# Patient Record
Sex: MALE | Race: WHITE | Employment: FULL TIME | URBAN - METROPOLITAN AREA
[De-identification: names, ages, dates, MRNs, and addresses within clinical notes are randomized per-mention and may not be internally consistent; named-entity substitution may affect disease eponyms.]

---

## 2017-04-14 RX ORDER — LEVOTHYROXINE SODIUM 0.15 MG/1
TABLET ORAL
Qty: 90 TABLET | Refills: 0 | Status: SHIPPED | OUTPATIENT
Start: 2017-04-14 | End: 2017-07-20

## 2017-04-14 RX ORDER — LISINOPRIL AND HYDROCHLOROTHIAZIDE 12.5; 1 MG/1; MG/1
TABLET ORAL
Qty: 90 TABLET | Refills: 0 | Status: SHIPPED | OUTPATIENT
Start: 2017-04-14 | End: 2017-07-20

## 2017-07-20 RX ORDER — LISINOPRIL AND HYDROCHLOROTHIAZIDE 12.5; 1 MG/1; MG/1
TABLET ORAL
Qty: 30 TABLET | Refills: 0 | Status: SHIPPED | OUTPATIENT
Start: 2017-07-20 | End: 2017-08-11

## 2017-07-20 RX ORDER — LEVOTHYROXINE SODIUM 0.15 MG/1
TABLET ORAL
Qty: 30 TABLET | Refills: 0 | Status: SHIPPED | OUTPATIENT
Start: 2017-07-20 | End: 2017-08-11

## 2017-07-20 NOTE — TELEPHONE ENCOUNTER
Last OV was 5/9/17  Last refill levothyroxine and lisinopril was on 4/14/16      Rizzoma. Pt is over due for a medication f/u. Please call and schedule Pt for an appointment.      30 day supply is pending Dr. Melodie Sims approval. A 90 day supply will no

## 2017-08-04 ENCOUNTER — TELEPHONE (OUTPATIENT)
Dept: FAMILY MEDICINE CLINIC | Facility: CLINIC | Age: 67
End: 2017-08-04

## 2017-08-04 NOTE — TELEPHONE ENCOUNTER
Patient called and made appt for next week for med follow up. Levothyroxine  Lisinopril     He wanted to do his labs prior to the appt. He wanted to add Anemia lab since he was borderline last time and anything else he was due for.   His appt is at 2:00 an

## 2017-08-04 NOTE — TELEPHONE ENCOUNTER
Patient called and made appt for next week for med follow up. Levothyroxine  Lisinopril    He wanted to do his labs prior to the appt. He wanted to add Anemia lab since he was borderline last time and anything else he was due for.   His appt is at 2:00 and

## 2017-08-07 NOTE — TELEPHONE ENCOUNTER
Would you like to do Anemia labs, IRon, TIBC and ferritin? Pt labs in 2016 showed decreased iron level.

## 2017-08-11 ENCOUNTER — OFFICE VISIT (OUTPATIENT)
Dept: FAMILY MEDICINE CLINIC | Facility: CLINIC | Age: 67
End: 2017-08-11

## 2017-08-11 ENCOUNTER — LAB ENCOUNTER (OUTPATIENT)
Dept: LAB | Age: 67
End: 2017-08-11
Attending: FAMILY MEDICINE
Payer: COMMERCIAL

## 2017-08-11 VITALS
TEMPERATURE: 98 F | HEART RATE: 64 BPM | SYSTOLIC BLOOD PRESSURE: 116 MMHG | BODY MASS INDEX: 22.88 KG/M2 | HEIGHT: 64 IN | WEIGHT: 134 LBS | RESPIRATION RATE: 15 BRPM | DIASTOLIC BLOOD PRESSURE: 80 MMHG

## 2017-08-11 DIAGNOSIS — E03.9 ACQUIRED HYPOTHYROIDISM: ICD-10-CM

## 2017-08-11 DIAGNOSIS — D50.0 IRON DEFICIENCY ANEMIA DUE TO CHRONIC BLOOD LOSS: Primary | ICD-10-CM

## 2017-08-11 DIAGNOSIS — D50.0 IRON DEFICIENCY ANEMIA DUE TO CHRONIC BLOOD LOSS: ICD-10-CM

## 2017-08-11 LAB
25-HYDROXYVITAMIN D (TOTAL): 38.7 NG/ML (ref 30–100)
ALBUMIN SERPL-MCNC: 3.7 G/DL (ref 3.5–4.8)
ALP LIVER SERPL-CCNC: 108 U/L (ref 45–117)
ALT SERPL-CCNC: 48 U/L (ref 17–63)
AST SERPL-CCNC: 26 U/L (ref 15–41)
BASOPHILS # BLD AUTO: 0.03 X10(3) UL (ref 0–0.1)
BASOPHILS NFR BLD AUTO: 0.4 %
BILIRUB SERPL-MCNC: 0.5 MG/DL (ref 0.1–2)
BUN BLD-MCNC: 20 MG/DL (ref 8–20)
CALCIUM BLD-MCNC: 9 MG/DL (ref 8.3–10.3)
CHLORIDE: 109 MMOL/L (ref 101–111)
CHOLEST SMN-MCNC: 157 MG/DL (ref ?–200)
CO2: 25 MMOL/L (ref 22–32)
CREAT BLD-MCNC: 1.13 MG/DL (ref 0.7–1.3)
DEPRECATED HBV CORE AB SER IA-ACNC: 7.4 NG/ML (ref 22–322)
EOSINOPHIL # BLD AUTO: 0.15 X10(3) UL (ref 0–0.3)
EOSINOPHIL NFR BLD AUTO: 2 %
ERYTHROCYTE [DISTWIDTH] IN BLOOD BY AUTOMATED COUNT: 12.9 % (ref 11.5–16)
GLUCOSE BLD-MCNC: 92 MG/DL (ref 70–99)
HCT VFR BLD AUTO: 42.9 % (ref 37–53)
HDLC SERPL-MCNC: 44 MG/DL (ref 45–?)
HDLC SERPL: 3.57 {RATIO} (ref ?–4.97)
HEPATITIS C VIRUS AB INTERPRETATION: NONREACTIVE
HGB BLD-MCNC: 13.9 G/DL (ref 13–17)
IMMATURE GRANULOCYTE COUNT: 0.05 X10(3) UL (ref 0–1)
IMMATURE GRANULOCYTE RATIO %: 0.7 %
LDLC SERPL CALC-MCNC: 87 MG/DL (ref ?–130)
LDLC SERPL-MCNC: 26 MG/DL (ref 5–40)
LYMPHOCYTES # BLD AUTO: 1 X10(3) UL (ref 0.9–4)
LYMPHOCYTES NFR BLD AUTO: 13.4 %
M PROTEIN MFR SERPL ELPH: 7.1 G/DL (ref 6.1–8.3)
MCH RBC QN AUTO: 31 PG (ref 27–33.2)
MCHC RBC AUTO-ENTMCNC: 32.4 G/DL (ref 31–37)
MCV RBC AUTO: 95.8 FL (ref 80–99)
MONOCYTES # BLD AUTO: 0.69 X10(3) UL (ref 0.1–0.6)
MONOCYTES NFR BLD AUTO: 9.3 %
NEUTROPHIL ABS PRELIM: 5.53 X10 (3) UL (ref 1.3–6.7)
NEUTROPHILS # BLD AUTO: 5.53 X10(3) UL (ref 1.3–6.7)
NEUTROPHILS NFR BLD AUTO: 74.2 %
NONHDLC SERPL-MCNC: 113 MG/DL (ref ?–130)
PLATELET # BLD AUTO: 325 10(3)UL (ref 150–450)
POTASSIUM SERPL-SCNC: 4.2 MMOL/L (ref 3.6–5.1)
PSA SERPL-MCNC: 2 NG/ML (ref 0.01–4)
RBC # BLD AUTO: 4.48 X10(6)UL (ref 3.8–5.8)
RED CELL DISTRIBUTION WIDTH-SD: 45.2 FL (ref 35.1–46.3)
RETIC ABS CALC AUTO: 66.3 X10(3) UL (ref 22.5–147.5)
RETIC IRF CALC: 0.15 RATIO (ref 0.09–0.3)
RETIC%: 1.5 % (ref 0.5–2.5)
RETICULOCYTE HEMOGLOBIN EQUIVALENT: 38.8 PG (ref 28.2–36.3)
SODIUM SERPL-SCNC: 141 MMOL/L (ref 136–144)
TRIGLYCERIDES: 128 MG/DL (ref ?–150)
TSI SER-ACNC: 3.16 MIU/ML (ref 0.35–5.5)
WBC # BLD AUTO: 7.5 X10(3) UL (ref 4–13)

## 2017-08-11 PROCEDURE — 99213 OFFICE O/P EST LOW 20 MIN: CPT | Performed by: FAMILY MEDICINE

## 2017-08-11 PROCEDURE — 86803 HEPATITIS C AB TEST: CPT | Performed by: FAMILY MEDICINE

## 2017-08-11 PROCEDURE — 85045 AUTOMATED RETICULOCYTE COUNT: CPT | Performed by: FAMILY MEDICINE

## 2017-08-11 PROCEDURE — 82306 VITAMIN D 25 HYDROXY: CPT | Performed by: FAMILY MEDICINE

## 2017-08-11 PROCEDURE — 80050 GENERAL HEALTH PANEL: CPT | Performed by: FAMILY MEDICINE

## 2017-08-11 PROCEDURE — 36415 COLL VENOUS BLD VENIPUNCTURE: CPT | Performed by: FAMILY MEDICINE

## 2017-08-11 PROCEDURE — 82728 ASSAY OF FERRITIN: CPT | Performed by: FAMILY MEDICINE

## 2017-08-11 PROCEDURE — 80061 LIPID PANEL: CPT | Performed by: FAMILY MEDICINE

## 2017-08-11 PROCEDURE — 84153 ASSAY OF PSA TOTAL: CPT | Performed by: FAMILY MEDICINE

## 2017-08-11 RX ORDER — LISINOPRIL AND HYDROCHLOROTHIAZIDE 12.5; 1 MG/1; MG/1
TABLET ORAL
Qty: 90 TABLET | Refills: 3 | Status: SHIPPED | OUTPATIENT
Start: 2017-08-11 | End: 2018-08-16

## 2017-08-11 RX ORDER — LEVOTHYROXINE SODIUM 0.15 MG/1
TABLET ORAL
Qty: 90 TABLET | Refills: 3 | Status: SHIPPED | OUTPATIENT
Start: 2017-08-11 | End: 2018-08-16

## 2017-08-11 NOTE — PROGRESS NOTES
Here for review and renewal of medications. On a happy note    He won one yet another contest for pull ups      Exam vital signs are normal he is in no respiratory distress his thyroid inspects and palpates normally and he is clinically euthyroid.   His yulisa

## 2017-08-14 ENCOUNTER — TELEPHONE (OUTPATIENT)
Dept: FAMILY MEDICINE CLINIC | Facility: CLINIC | Age: 67
End: 2017-08-14

## 2017-08-14 DIAGNOSIS — D50.0 IRON DEFICIENCY ANEMIA DUE TO CHRONIC BLOOD LOSS: Primary | ICD-10-CM

## 2017-08-14 NOTE — TELEPHONE ENCOUNTER
Patient called per request of Dr. Yamilet Brush- Ferritin level very low at 7.4. Left message to call back. Patient needs to see Hematology.

## 2017-08-14 NOTE — TELEPHONE ENCOUNTER
Patient returned phone call- feels fine. Will contact Hematology for appt. Dr. Maya Dnucan aware patient feeling fine. Spoke to [facility] and patient can be seen 8/22/17 at 11:15.

## 2017-08-16 ENCOUNTER — LAB ENCOUNTER (OUTPATIENT)
Dept: LAB | Age: 67
End: 2017-08-16
Attending: FAMILY MEDICINE
Payer: COMMERCIAL

## 2017-08-16 DIAGNOSIS — D50.0 IRON DEFICIENCY ANEMIA DUE TO CHRONIC BLOOD LOSS: ICD-10-CM

## 2017-08-16 LAB
BASOPHILS # BLD AUTO: 0.03 X10(3) UL (ref 0–0.1)
BASOPHILS NFR BLD AUTO: 0.4 %
EOSINOPHIL # BLD AUTO: 0.2 X10(3) UL (ref 0–0.3)
EOSINOPHIL NFR BLD AUTO: 2.8 %
ERYTHROCYTE [DISTWIDTH] IN BLOOD BY AUTOMATED COUNT: 12.8 % (ref 11.5–16)
HCT VFR BLD AUTO: 40 % (ref 37–53)
HGB BLD-MCNC: 12.7 G/DL (ref 13–17)
IMMATURE GRANULOCYTE COUNT: 0.04 X10(3) UL (ref 0–1)
IMMATURE GRANULOCYTE RATIO %: 0.6 %
LYMPHOCYTES # BLD AUTO: 1.15 X10(3) UL (ref 0.9–4)
LYMPHOCYTES NFR BLD AUTO: 16 %
MCH RBC QN AUTO: 30.6 PG (ref 27–33.2)
MCHC RBC AUTO-ENTMCNC: 31.8 G/DL (ref 31–37)
MCV RBC AUTO: 96.4 FL (ref 80–99)
MONOCYTES # BLD AUTO: 0.76 X10(3) UL (ref 0.1–0.6)
MONOCYTES NFR BLD AUTO: 10.5 %
NEUTROPHIL ABS PRELIM: 5.03 X10 (3) UL (ref 1.3–6.7)
NEUTROPHILS # BLD AUTO: 5.03 X10(3) UL (ref 1.3–6.7)
NEUTROPHILS NFR BLD AUTO: 69.7 %
PLATELET # BLD AUTO: 282 10(3)UL (ref 150–450)
RBC # BLD AUTO: 4.15 X10(6)UL (ref 3.8–5.8)
RED CELL DISTRIBUTION WIDTH-SD: 45.7 FL (ref 35.1–46.3)
WBC # BLD AUTO: 7.2 X10(3) UL (ref 4–13)

## 2017-08-16 PROCEDURE — 85025 COMPLETE CBC W/AUTO DIFF WBC: CPT | Performed by: FAMILY MEDICINE

## 2017-08-16 PROCEDURE — 36415 COLL VENOUS BLD VENIPUNCTURE: CPT | Performed by: FAMILY MEDICINE

## 2017-08-22 ENCOUNTER — OFFICE VISIT (OUTPATIENT)
Dept: HEMATOLOGY/ONCOLOGY | Facility: HOSPITAL | Age: 67
End: 2017-08-22
Attending: INTERNAL MEDICINE
Payer: COMMERCIAL

## 2017-08-22 VITALS
SYSTOLIC BLOOD PRESSURE: 118 MMHG | WEIGHT: 135 LBS | HEART RATE: 68 BPM | DIASTOLIC BLOOD PRESSURE: 75 MMHG | RESPIRATION RATE: 16 BRPM | BODY MASS INDEX: 23 KG/M2 | TEMPERATURE: 98 F

## 2017-08-22 DIAGNOSIS — D50.0 IRON DEFICIENCY ANEMIA DUE TO CHRONIC BLOOD LOSS: Primary | ICD-10-CM

## 2017-08-22 PROCEDURE — 99214 OFFICE O/P EST MOD 30 MIN: CPT | Performed by: INTERNAL MEDICINE

## 2017-08-22 RX ORDER — DOXEPIN HYDROCHLORIDE 50 MG/1
1 CAPSULE ORAL DAILY
COMMUNITY

## 2017-08-22 RX ORDER — FERROUS SULFATE 325(65) MG
325 TABLET ORAL 2 TIMES DAILY
Qty: 60 TABLET | Refills: 4 | Status: SHIPPED | OUTPATIENT
Start: 2017-08-22 | End: 2018-03-14

## 2017-08-22 RX ORDER — FERROUS SULFATE 7.5 MG/0.5
SYRINGE (EA) ORAL DAILY
COMMUNITY
End: 2017-08-22 | Stop reason: CLARIF

## 2017-08-22 NOTE — PROGRESS NOTES
Patient here for consult, but patient has seen Dr Nickie Keller in the past. Patient had recent lab work done and ferritin was 7.4. Patient is very healthy and fit and says he feels great and is not having any of the symptoms you would expect.  Patient denies troubl

## 2017-08-22 NOTE — PROGRESS NOTES
[facility]cer Center Progress Note    Problem List:      Patient Active Problem List:     Benign neoplasm of colon     Unspecified cataract     Unspecified asthma(493.90)     Hypothyroid     Essential hypertension, benign     Laboratory examination ordered as par mouth daily. Disp:  Rfl:    Ferrous Sulfate 325 (65 Fe) MG Oral Tab Take 1 tablet (325 mg total) by mouth 2 (two) times daily. Disp: 60 tablet Rfl: 4   Cholecalciferol (VITAMIN D) 1000 units Oral Tab Take 1 capsule by mouth daily.  Disp:  Rfl:    Levothyrox 08/11/2017   ALB 3.7 08/11/2017   TP 7.1 08/11/2017           Impression:     Iron deficiency anemia: This has been chronic with little change over the last year. He has been taking once daily oral iron with minimal increase in the ferritin.  He had a ne

## 2017-11-22 ENCOUNTER — APPOINTMENT (OUTPATIENT)
Dept: HEMATOLOGY/ONCOLOGY | Facility: HOSPITAL | Age: 67
End: 2017-11-22
Attending: INTERNAL MEDICINE
Payer: COMMERCIAL

## 2017-12-20 ENCOUNTER — TELEPHONE (OUTPATIENT)
Dept: HEMATOLOGY/ONCOLOGY | Facility: HOSPITAL | Age: 67
End: 2017-12-20

## 2017-12-20 NOTE — TELEPHONE ENCOUNTER
Pt states he had his scopes. He was told the cause of his low iron is ulcers in his esophagus and intestine. Does he need to keep his appointment with Dr. Viktoria Burrows on Friday? Per Dr. Viktoria Burrows -- no if GI handling his low iron.   Pt informed and verbalized under

## 2017-12-22 ENCOUNTER — APPOINTMENT (OUTPATIENT)
Dept: HEMATOLOGY/ONCOLOGY | Age: 67
End: 2017-12-22
Payer: COMMERCIAL

## 2018-03-14 ENCOUNTER — OFFICE VISIT (OUTPATIENT)
Dept: FAMILY MEDICINE CLINIC | Facility: CLINIC | Age: 68
End: 2018-03-14

## 2018-03-14 ENCOUNTER — LABORATORY ENCOUNTER (OUTPATIENT)
Dept: LAB | Age: 68
End: 2018-03-14
Attending: FAMILY MEDICINE
Payer: COMMERCIAL

## 2018-03-14 VITALS
WEIGHT: 140 LBS | OXYGEN SATURATION: 98 % | HEART RATE: 76 BPM | TEMPERATURE: 98 F | DIASTOLIC BLOOD PRESSURE: 84 MMHG | RESPIRATION RATE: 18 BRPM | BODY MASS INDEX: 25.76 KG/M2 | HEIGHT: 62 IN | SYSTOLIC BLOOD PRESSURE: 128 MMHG

## 2018-03-14 DIAGNOSIS — K27.9 PEPTIC ULCER: ICD-10-CM

## 2018-03-14 DIAGNOSIS — E03.9 HYPOTHYROIDISM, UNSPECIFIED TYPE: ICD-10-CM

## 2018-03-14 DIAGNOSIS — I10 ESSENTIAL HYPERTENSION, BENIGN: ICD-10-CM

## 2018-03-14 DIAGNOSIS — K27.9 PEPTIC ULCER: Primary | ICD-10-CM

## 2018-03-14 LAB
BASOPHILS # BLD AUTO: 0.04 X10(3) UL (ref 0–0.1)
BASOPHILS NFR BLD AUTO: 0.6 %
DEPRECATED HBV CORE AB SER IA-ACNC: 32.8 NG/ML (ref 22–322)
EOSINOPHIL # BLD AUTO: 0.17 X10(3) UL (ref 0–0.3)
EOSINOPHIL NFR BLD AUTO: 2.4 %
ERYTHROCYTE [DISTWIDTH] IN BLOOD BY AUTOMATED COUNT: 11.7 % (ref 11.5–16)
HCT VFR BLD AUTO: 46.6 % (ref 37–53)
HGB BLD-MCNC: 15.1 G/DL (ref 13–17)
IMMATURE GRANULOCYTE COUNT: 0.04 X10(3) UL (ref 0–1)
IMMATURE GRANULOCYTE RATIO %: 0.6 %
LYMPHOCYTES # BLD AUTO: 1.11 X10(3) UL (ref 0.9–4)
LYMPHOCYTES NFR BLD AUTO: 15.4 %
MCH RBC QN AUTO: 32.1 PG (ref 27–33.2)
MCHC RBC AUTO-ENTMCNC: 32.4 G/DL (ref 31–37)
MCV RBC AUTO: 99.1 FL (ref 80–99)
MONOCYTES # BLD AUTO: 0.78 X10(3) UL (ref 0.1–1)
MONOCYTES NFR BLD AUTO: 10.8 %
NEUTROPHIL ABS PRELIM: 5.05 X10 (3) UL (ref 1.3–6.7)
NEUTROPHILS # BLD AUTO: 5.05 X10(3) UL (ref 1.3–6.7)
NEUTROPHILS NFR BLD AUTO: 70.2 %
PLATELET # BLD AUTO: 292 10(3)UL (ref 150–450)
RBC # BLD AUTO: 4.7 X10(6)UL (ref 3.8–5.8)
RED CELL DISTRIBUTION WIDTH-SD: 42.6 FL (ref 35.1–46.3)
RETIC ABS CALC AUTO: 89.3 X10(3) UL (ref 22.5–147.5)
RETIC IRF CALC: 0.17 RATIO (ref 0.09–0.3)
RETIC%: 1.9 % (ref 0.5–2.5)
RETICULOCYTE HEMOGLOBIN EQUIVALENT: 39.3 PG (ref 28.2–36.3)
TSI SER-ACNC: 2.57 MIU/ML (ref 0.35–5.5)
WBC # BLD AUTO: 7.2 X10(3) UL (ref 4–13)

## 2018-03-14 PROCEDURE — 99213 OFFICE O/P EST LOW 20 MIN: CPT | Performed by: FAMILY MEDICINE

## 2018-03-14 PROCEDURE — 84443 ASSAY THYROID STIM HORMONE: CPT | Performed by: FAMILY MEDICINE

## 2018-03-14 PROCEDURE — 82728 ASSAY OF FERRITIN: CPT | Performed by: FAMILY MEDICINE

## 2018-03-14 PROCEDURE — 85025 COMPLETE CBC W/AUTO DIFF WBC: CPT | Performed by: FAMILY MEDICINE

## 2018-03-14 PROCEDURE — 36415 COLL VENOUS BLD VENIPUNCTURE: CPT | Performed by: FAMILY MEDICINE

## 2018-03-14 PROCEDURE — 85045 AUTOMATED RETICULOCYTE COUNT: CPT | Performed by: FAMILY MEDICINE

## 2018-03-14 NOTE — PROGRESS NOTES
Here for general follow-up. Had an acute microcytic anemia and it was discovered that he had both small intestinal and esophageal ulcers. He was placed on iron replacement and proton pump inhibitor and is now entirely symptom-free.   No family history of

## 2018-03-16 ENCOUNTER — TELEPHONE (OUTPATIENT)
Dept: FAMILY MEDICINE CLINIC | Facility: CLINIC | Age: 68
End: 2018-03-16

## 2018-03-16 NOTE — TELEPHONE ENCOUNTER
Informed pt that labs are back, but not yet reviewed due to [address] out of office for a few days, back on Monday. Pt expressed understanding and agreement. Pt requested the Ferritin value and pt was informed of this level.

## 2018-08-16 RX ORDER — LEVOTHYROXINE SODIUM 0.15 MG/1
TABLET ORAL
Qty: 90 TABLET | Refills: 3 | Status: SHIPPED | OUTPATIENT
Start: 2018-08-16 | End: 2019-03-06

## 2018-08-16 RX ORDER — LISINOPRIL AND HYDROCHLOROTHIAZIDE 12.5; 1 MG/1; MG/1
TABLET ORAL
Qty: 90 TABLET | Refills: 3 | Status: SHIPPED | OUTPATIENT
Start: 2018-08-16 | End: 2019-03-06

## 2018-08-16 NOTE — TELEPHONE ENCOUNTER
Levothyroxine passes protocol - requesting 12 month supply    LOV 3/14/18    LRx 8/11/17    Please review and advise

## 2019-03-06 NOTE — TELEPHONE ENCOUNTER
Approve/ deny Levothyroxine 150 mcg?   #90  Approve/ deny Lisinopril 10/12.5 mg?   #90    Patient has physical exam scheduled with Karina Larose on 03/12/19

## 2019-03-06 NOTE — TELEPHONE ENCOUNTER
Levothyroxine  Lisinopril-HCTZ    [facility]  90 days if possible    Patient has upcoming appt for physical

## 2019-03-07 RX ORDER — LEVOTHYROXINE SODIUM 0.15 MG/1
TABLET ORAL
Qty: 90 TABLET | Refills: 0 | Status: SHIPPED | OUTPATIENT
Start: 2019-03-07 | End: 2019-03-12

## 2019-03-07 RX ORDER — LISINOPRIL AND HYDROCHLOROTHIAZIDE 12.5; 1 MG/1; MG/1
TABLET ORAL
Qty: 90 TABLET | Refills: 0 | Status: SHIPPED | OUTPATIENT
Start: 2019-03-07 | End: 2019-03-12

## 2019-03-12 ENCOUNTER — TELEPHONE (OUTPATIENT)
Dept: FAMILY MEDICINE CLINIC | Facility: CLINIC | Age: 69
End: 2019-03-12

## 2019-03-12 ENCOUNTER — LAB ENCOUNTER (OUTPATIENT)
Dept: LAB | Age: 69
End: 2019-03-12
Attending: FAMILY MEDICINE
Payer: COMMERCIAL

## 2019-03-12 ENCOUNTER — OFFICE VISIT (OUTPATIENT)
Dept: FAMILY MEDICINE CLINIC | Facility: CLINIC | Age: 69
End: 2019-03-12
Payer: COMMERCIAL

## 2019-03-12 VITALS
TEMPERATURE: 97 F | OXYGEN SATURATION: 95 % | WEIGHT: 139 LBS | DIASTOLIC BLOOD PRESSURE: 74 MMHG | SYSTOLIC BLOOD PRESSURE: 116 MMHG | RESPIRATION RATE: 16 BRPM | BODY MASS INDEX: 25.58 KG/M2 | HEART RATE: 74 BPM | HEIGHT: 62 IN

## 2019-03-12 DIAGNOSIS — I10 ESSENTIAL HYPERTENSION, BENIGN: ICD-10-CM

## 2019-03-12 DIAGNOSIS — Z12.5 ENCOUNTER FOR SCREENING FOR MALIGNANT NEOPLASM OF PROSTATE: ICD-10-CM

## 2019-03-12 DIAGNOSIS — J45.20 MILD INTERMITTENT ASTHMA WITHOUT COMPLICATION: ICD-10-CM

## 2019-03-12 DIAGNOSIS — Z00.00 LABORATORY EXAM ORDERED AS PART OF ROUTINE GENERAL MEDICAL EXAMINATION: ICD-10-CM

## 2019-03-12 DIAGNOSIS — Z12.11 SCREENING FOR MALIGNANT NEOPLASM OF COLON: ICD-10-CM

## 2019-03-12 DIAGNOSIS — Z00.00 WELL ADULT EXAM: Primary | ICD-10-CM

## 2019-03-12 DIAGNOSIS — E03.9 ACQUIRED HYPOTHYROIDISM: ICD-10-CM

## 2019-03-12 DIAGNOSIS — D50.0 BLOOD LOSS ANEMIA: ICD-10-CM

## 2019-03-12 LAB
ALBUMIN SERPL-MCNC: 3.4 G/DL (ref 3.4–5)
ALBUMIN/GLOB SERPL: 1 {RATIO} (ref 1–2)
ALP LIVER SERPL-CCNC: 106 U/L (ref 45–117)
ALT SERPL-CCNC: 44 U/L (ref 16–61)
ANION GAP SERPL CALC-SCNC: 9 MMOL/L (ref 0–18)
AST SERPL-CCNC: 29 U/L (ref 15–37)
BASOPHILS # BLD AUTO: 0.04 X10(3) UL (ref 0–0.2)
BASOPHILS NFR BLD AUTO: 0.4 %
BILIRUB SERPL-MCNC: 0.3 MG/DL (ref 0.1–2)
BUN BLD-MCNC: 33 MG/DL (ref 7–18)
BUN/CREAT SERPL: 28.9 (ref 10–20)
CALCIUM BLD-MCNC: 8.4 MG/DL (ref 8.5–10.1)
CHLORIDE SERPL-SCNC: 109 MMOL/L (ref 98–107)
CHOLEST SMN-MCNC: 163 MG/DL (ref ?–200)
CO2 SERPL-SCNC: 22 MMOL/L (ref 21–32)
COMPLEXED PSA SERPL-MCNC: 2.06 NG/ML (ref ?–4)
CREAT BLD-MCNC: 1.14 MG/DL (ref 0.7–1.3)
DEPRECATED HBV CORE AB SER IA-ACNC: 15.8 NG/ML (ref 30–530)
DEPRECATED RDW RBC AUTO: 44.6 FL (ref 35.1–46.3)
EOSINOPHIL # BLD AUTO: 0.21 X10(3) UL (ref 0–0.7)
EOSINOPHIL NFR BLD AUTO: 2.2 %
ERYTHROCYTE [DISTWIDTH] IN BLOOD BY AUTOMATED COUNT: 12.5 % (ref 11–15)
GLOBULIN PLAS-MCNC: 3.3 G/DL (ref 2.8–4.4)
GLUCOSE BLD-MCNC: 93 MG/DL (ref 70–99)
HCT VFR BLD AUTO: 43.8 % (ref 39–53)
HDLC SERPL-MCNC: 33 MG/DL (ref 40–59)
HGB BLD-MCNC: 14.6 G/DL (ref 13–17.5)
IMM GRANULOCYTES # BLD AUTO: 0.04 X10(3) UL (ref 0–1)
IMM GRANULOCYTES NFR BLD: 0.4 %
LDLC SERPL CALC-MCNC: 103 MG/DL (ref ?–100)
LYMPHOCYTES # BLD AUTO: 1.13 X10(3) UL (ref 1–4)
LYMPHOCYTES NFR BLD AUTO: 11.6 %
M PROTEIN MFR SERPL ELPH: 6.7 G/DL (ref 6.4–8.2)
MCH RBC QN AUTO: 32.5 PG (ref 26–34)
MCHC RBC AUTO-ENTMCNC: 33.3 G/DL (ref 31–37)
MCV RBC AUTO: 97.6 FL (ref 80–100)
MONOCYTES # BLD AUTO: 1.24 X10(3) UL (ref 0.1–1)
MONOCYTES NFR BLD AUTO: 12.8 %
NEUTROPHILS # BLD AUTO: 7.04 X10 (3) UL (ref 1.5–7.7)
NEUTROPHILS # BLD AUTO: 7.04 X10(3) UL (ref 1.5–7.7)
NEUTROPHILS NFR BLD AUTO: 72.6 %
NONHDLC SERPL-MCNC: 130 MG/DL (ref ?–130)
OSMOLALITY SERPL CALC.SUM OF ELEC: 297 MOSM/KG (ref 275–295)
PLATELET # BLD AUTO: 301 10(3)UL (ref 150–450)
POTASSIUM SERPL-SCNC: 5.2 MMOL/L (ref 3.5–5.1)
RBC # BLD AUTO: 4.49 X10(6)UL (ref 3.8–5.8)
SODIUM SERPL-SCNC: 140 MMOL/L (ref 136–145)
TRIGL SERPL-MCNC: 135 MG/DL (ref 30–149)
TSI SER-ACNC: 3.65 MIU/ML (ref 0.36–3.74)
VLDLC SERPL CALC-MCNC: 27 MG/DL (ref 0–30)
WBC # BLD AUTO: 9.7 X10(3) UL (ref 4–11)

## 2019-03-12 PROCEDURE — 80061 LIPID PANEL: CPT

## 2019-03-12 PROCEDURE — 85025 COMPLETE CBC W/AUTO DIFF WBC: CPT

## 2019-03-12 PROCEDURE — 99397 PER PM REEVAL EST PAT 65+ YR: CPT | Performed by: FAMILY MEDICINE

## 2019-03-12 PROCEDURE — 80053 COMPREHEN METABOLIC PANEL: CPT

## 2019-03-12 PROCEDURE — 82728 ASSAY OF FERRITIN: CPT

## 2019-03-12 PROCEDURE — 84443 ASSAY THYROID STIM HORMONE: CPT

## 2019-03-12 PROCEDURE — 36415 COLL VENOUS BLD VENIPUNCTURE: CPT

## 2019-03-12 RX ORDER — ALBUTEROL SULFATE 90 UG/1
2 AEROSOL, METERED RESPIRATORY (INHALATION) EVERY 6 HOURS PRN
Qty: 1 INHALER | Refills: 3 | Status: SHIPPED | OUTPATIENT
Start: 2019-03-12

## 2019-03-12 RX ORDER — LISINOPRIL AND HYDROCHLOROTHIAZIDE 12.5; 1 MG/1; MG/1
TABLET ORAL
Qty: 90 TABLET | Refills: 3 | Status: SHIPPED | OUTPATIENT
Start: 2019-03-12 | End: 2019-09-04

## 2019-03-12 RX ORDER — LEVOTHYROXINE SODIUM 0.15 MG/1
TABLET ORAL
Qty: 90 TABLET | Refills: 3 | Status: SHIPPED | OUTPATIENT
Start: 2019-03-12 | End: 2019-06-17

## 2019-03-12 NOTE — PROGRESS NOTES
Presents for a physical he has no complaints. Several years ago went through quite a bit with dealing with his peptic ulcer disease/duodenitis. Otherwise he is a very healthy gentleman in no distress.   Just lost his mom to the ravages of Alzheimer's dise

## 2019-06-17 RX ORDER — LEVOTHYROXINE SODIUM 0.15 MG/1
TABLET ORAL
Qty: 90 TABLET | Refills: 3 | Status: SHIPPED | OUTPATIENT
Start: 2019-06-17 | End: 2020-05-28

## 2019-09-04 RX ORDER — LISINOPRIL AND HYDROCHLOROTHIAZIDE 12.5; 1 MG/1; MG/1
TABLET ORAL
Qty: 90 TABLET | Refills: 3 | Status: SHIPPED | OUTPATIENT
Start: 2019-09-04 | End: 2020-05-28

## 2020-05-28 ENCOUNTER — TELEPHONE (OUTPATIENT)
Dept: FAMILY MEDICINE CLINIC | Facility: CLINIC | Age: 70
End: 2020-05-28

## 2020-05-28 RX ORDER — LEVOTHYROXINE SODIUM 0.15 MG/1
TABLET ORAL
Qty: 90 TABLET | Refills: 0 | Status: SHIPPED | OUTPATIENT
Start: 2020-05-28 | End: 2020-08-19

## 2020-05-28 RX ORDER — LISINOPRIL AND HYDROCHLOROTHIAZIDE 12.5; 1 MG/1; MG/1
TABLET ORAL
Qty: 90 TABLET | Refills: 0 | Status: SHIPPED | OUTPATIENT
Start: 2020-05-28 | End: 2020-08-18

## 2020-05-28 NOTE — TELEPHONE ENCOUNTER
Pt almost out of levothyroxine - has px appt sched in July - he also will need his lisinopril before appt

## 2020-06-22 ENCOUNTER — OFFICE VISIT (OUTPATIENT)
Dept: FAMILY MEDICINE CLINIC | Facility: CLINIC | Age: 70
End: 2020-06-22
Payer: COMMERCIAL

## 2020-06-22 ENCOUNTER — LAB ENCOUNTER (OUTPATIENT)
Dept: LAB | Age: 70
End: 2020-06-22
Attending: FAMILY MEDICINE
Payer: COMMERCIAL

## 2020-06-22 VITALS
BODY MASS INDEX: 24.84 KG/M2 | RESPIRATION RATE: 16 BRPM | TEMPERATURE: 98 F | DIASTOLIC BLOOD PRESSURE: 76 MMHG | WEIGHT: 135 LBS | SYSTOLIC BLOOD PRESSURE: 116 MMHG | HEIGHT: 62 IN

## 2020-06-22 DIAGNOSIS — Z00.00 ANNUAL PHYSICAL EXAM: Primary | ICD-10-CM

## 2020-06-22 DIAGNOSIS — Z00.00 ANNUAL PHYSICAL EXAM: ICD-10-CM

## 2020-06-22 PROCEDURE — 82728 ASSAY OF FERRITIN: CPT | Performed by: FAMILY MEDICINE

## 2020-06-22 PROCEDURE — 84153 ASSAY OF PSA TOTAL: CPT | Performed by: FAMILY MEDICINE

## 2020-06-22 PROCEDURE — 99397 PER PM REEVAL EST PAT 65+ YR: CPT | Performed by: FAMILY MEDICINE

## 2020-06-22 PROCEDURE — 80061 LIPID PANEL: CPT | Performed by: FAMILY MEDICINE

## 2020-06-22 PROCEDURE — 36415 COLL VENOUS BLD VENIPUNCTURE: CPT | Performed by: FAMILY MEDICINE

## 2020-06-22 PROCEDURE — 82306 VITAMIN D 25 HYDROXY: CPT | Performed by: FAMILY MEDICINE

## 2020-06-22 PROCEDURE — 80050 GENERAL HEALTH PANEL: CPT | Performed by: FAMILY MEDICINE

## 2020-06-22 PROCEDURE — 85045 AUTOMATED RETICULOCYTE COUNT: CPT | Performed by: FAMILY MEDICINE

## 2020-07-31 ENCOUNTER — OFFICE VISIT (OUTPATIENT)
Dept: FAMILY MEDICINE CLINIC | Facility: CLINIC | Age: 70
End: 2020-07-31
Payer: COMMERCIAL

## 2020-07-31 VITALS
WEIGHT: 134 LBS | HEIGHT: 62 IN | RESPIRATION RATE: 16 BRPM | BODY MASS INDEX: 24.66 KG/M2 | HEART RATE: 76 BPM | OXYGEN SATURATION: 98 % | SYSTOLIC BLOOD PRESSURE: 124 MMHG | DIASTOLIC BLOOD PRESSURE: 84 MMHG

## 2020-07-31 DIAGNOSIS — R79.0 LOW FERRITIN: Primary | ICD-10-CM

## 2020-07-31 PROBLEM — L25.5 PLANT DERMATITIS: Status: ACTIVE | Noted: 2020-07-31

## 2020-07-31 PROCEDURE — 3074F SYST BP LT 130 MM HG: CPT | Performed by: FAMILY MEDICINE

## 2020-07-31 PROCEDURE — 99213 OFFICE O/P EST LOW 20 MIN: CPT | Performed by: FAMILY MEDICINE

## 2020-07-31 PROCEDURE — 3008F BODY MASS INDEX DOCD: CPT | Performed by: FAMILY MEDICINE

## 2020-07-31 PROCEDURE — 3079F DIAST BP 80-89 MM HG: CPT | Performed by: FAMILY MEDICINE

## 2020-07-31 NOTE — PROGRESS NOTES
Presents with a disseminated rash in the mid torso and onto the hands and forearms. The rash is 9to 8days old. He has had similar rashes in the past that have been provoked with touching vegetation and working his garden and lawnmower.   His son has th

## 2020-08-18 RX ORDER — LISINOPRIL AND HYDROCHLOROTHIAZIDE 12.5; 1 MG/1; MG/1
TABLET ORAL
Qty: 90 TABLET | Refills: 3 | Status: SHIPPED | OUTPATIENT
Start: 2020-08-18 | End: 2021-09-22

## 2020-08-19 RX ORDER — LEVOTHYROXINE SODIUM 0.15 MG/1
TABLET ORAL
Qty: 90 TABLET | Refills: 3 | Status: SHIPPED | OUTPATIENT
Start: 2020-08-19 | End: 2021-09-28

## 2021-03-12 DIAGNOSIS — Z23 NEED FOR VACCINATION: ICD-10-CM

## 2021-08-31 ENCOUNTER — TELEPHONE (OUTPATIENT)
Dept: FAMILY MEDICINE CLINIC | Facility: CLINIC | Age: 71
End: 2021-08-31

## 2021-08-31 NOTE — TELEPHONE ENCOUNTER
Pt needs appt, last seen 7/31/20 with JAVI. Will also need updated labwork. Once appt made can send 30 day supply to local pharmacy if needed.

## 2021-09-22 ENCOUNTER — LAB ENCOUNTER (OUTPATIENT)
Dept: LAB | Age: 71
End: 2021-09-22
Attending: FAMILY MEDICINE
Payer: COMMERCIAL

## 2021-09-22 ENCOUNTER — OFFICE VISIT (OUTPATIENT)
Dept: FAMILY MEDICINE CLINIC | Facility: CLINIC | Age: 71
End: 2021-09-22
Payer: COMMERCIAL

## 2021-09-22 VITALS
RESPIRATION RATE: 16 BRPM | BODY MASS INDEX: 23.74 KG/M2 | OXYGEN SATURATION: 98 % | HEIGHT: 62 IN | DIASTOLIC BLOOD PRESSURE: 72 MMHG | SYSTOLIC BLOOD PRESSURE: 112 MMHG | HEART RATE: 64 BPM | WEIGHT: 129 LBS

## 2021-09-22 DIAGNOSIS — Z12.5 PROSTATE CANCER SCREENING: ICD-10-CM

## 2021-09-22 DIAGNOSIS — E03.9 HYPOTHYROIDISM, UNSPECIFIED TYPE: ICD-10-CM

## 2021-09-22 DIAGNOSIS — Z13.220 LIPID SCREENING: ICD-10-CM

## 2021-09-22 DIAGNOSIS — Z13.0 SCREENING FOR DEFICIENCY ANEMIA: ICD-10-CM

## 2021-09-22 DIAGNOSIS — D50.0 IRON DEFICIENCY ANEMIA DUE TO CHRONIC BLOOD LOSS: ICD-10-CM

## 2021-09-22 DIAGNOSIS — Z00.00 WELLNESS EXAMINATION: Primary | ICD-10-CM

## 2021-09-22 DIAGNOSIS — I10 ESSENTIAL HYPERTENSION, BENIGN: ICD-10-CM

## 2021-09-22 DIAGNOSIS — Z00.00 WELLNESS EXAMINATION: ICD-10-CM

## 2021-09-22 LAB
ALBUMIN SERPL-MCNC: 3.4 G/DL (ref 3.4–5)
ALBUMIN/GLOB SERPL: 0.9 {RATIO} (ref 1–2)
ALP LIVER SERPL-CCNC: 110 U/L
ALT SERPL-CCNC: 40 U/L
ANION GAP SERPL CALC-SCNC: 7 MMOL/L (ref 0–18)
AST SERPL-CCNC: 22 U/L (ref 15–37)
BASOPHILS # BLD AUTO: 0.05 X10(3) UL (ref 0–0.2)
BASOPHILS NFR BLD AUTO: 0.6 %
BILIRUB SERPL-MCNC: 0.5 MG/DL (ref 0.1–2)
BUN BLD-MCNC: 27 MG/DL (ref 7–18)
CALCIUM BLD-MCNC: 9.1 MG/DL (ref 8.5–10.1)
CHLORIDE SERPL-SCNC: 110 MMOL/L (ref 98–112)
CHOLEST SERPL-MCNC: 169 MG/DL (ref ?–200)
CO2 SERPL-SCNC: 24 MMOL/L (ref 21–32)
COMPLEXED PSA SERPL-MCNC: 2.18 NG/ML (ref ?–4)
CREAT BLD-MCNC: 1.03 MG/DL
DEPRECATED HBV CORE AB SER IA-ACNC: 7.4 NG/ML
EOSINOPHIL # BLD AUTO: 0.11 X10(3) UL (ref 0–0.7)
EOSINOPHIL NFR BLD AUTO: 1.4 %
ERYTHROCYTE [DISTWIDTH] IN BLOOD BY AUTOMATED COUNT: 12.6 %
GLOBULIN PLAS-MCNC: 3.8 G/DL (ref 2.8–4.4)
GLUCOSE BLD-MCNC: 93 MG/DL (ref 70–99)
HCT VFR BLD AUTO: 44.9 %
HDLC SERPL-MCNC: 38 MG/DL (ref 40–59)
HGB BLD-MCNC: 14.1 G/DL
IMM GRANULOCYTES # BLD AUTO: 0.03 X10(3) UL (ref 0–1)
IMM GRANULOCYTES NFR BLD: 0.4 %
LDLC SERPL CALC-MCNC: 109 MG/DL (ref ?–100)
LYMPHOCYTES # BLD AUTO: 0.9 X10(3) UL (ref 1–4)
LYMPHOCYTES NFR BLD AUTO: 11.3 %
MCH RBC QN AUTO: 31.2 PG (ref 26–34)
MCHC RBC AUTO-ENTMCNC: 31.4 G/DL (ref 31–37)
MCV RBC AUTO: 99.3 FL
MONOCYTES # BLD AUTO: 0.98 X10(3) UL (ref 0.1–1)
MONOCYTES NFR BLD AUTO: 12.3 %
NEUTROPHILS # BLD AUTO: 5.92 X10 (3) UL (ref 1.5–7.7)
NEUTROPHILS # BLD AUTO: 5.92 X10(3) UL (ref 1.5–7.7)
NEUTROPHILS NFR BLD AUTO: 74 %
NONHDLC SERPL-MCNC: 131 MG/DL (ref ?–130)
OSMOLALITY SERPL CALC.SUM OF ELEC: 297 MOSM/KG (ref 275–295)
PATIENT FASTING Y/N/NP: YES
PATIENT FASTING Y/N/NP: YES
PLATELET # BLD AUTO: 410 10(3)UL (ref 150–450)
POTASSIUM SERPL-SCNC: 4 MMOL/L (ref 3.5–5.1)
PROT SERPL-MCNC: 7.2 G/DL (ref 6.4–8.2)
RBC # BLD AUTO: 4.52 X10(6)UL
SODIUM SERPL-SCNC: 141 MMOL/L (ref 136–145)
T4 FREE SERPL-MCNC: 1.3 NG/DL (ref 0.8–1.7)
TRIGL SERPL-MCNC: 120 MG/DL (ref 30–149)
TSI SER-ACNC: 0.37 MIU/ML (ref 0.36–3.74)
VLDLC SERPL CALC-MCNC: 20 MG/DL (ref 0–30)
WBC # BLD AUTO: 8 X10(3) UL (ref 4–11)

## 2021-09-22 PROCEDURE — 3074F SYST BP LT 130 MM HG: CPT | Performed by: FAMILY MEDICINE

## 2021-09-22 PROCEDURE — 84153 ASSAY OF PSA TOTAL: CPT | Performed by: FAMILY MEDICINE

## 2021-09-22 PROCEDURE — 90471 IMMUNIZATION ADMIN: CPT | Performed by: FAMILY MEDICINE

## 2021-09-22 PROCEDURE — 3078F DIAST BP <80 MM HG: CPT | Performed by: FAMILY MEDICINE

## 2021-09-22 PROCEDURE — 3008F BODY MASS INDEX DOCD: CPT | Performed by: FAMILY MEDICINE

## 2021-09-22 PROCEDURE — 84439 ASSAY OF FREE THYROXINE: CPT | Performed by: FAMILY MEDICINE

## 2021-09-22 PROCEDURE — 99397 PER PM REEVAL EST PAT 65+ YR: CPT | Performed by: FAMILY MEDICINE

## 2021-09-22 PROCEDURE — 80061 LIPID PANEL: CPT | Performed by: FAMILY MEDICINE

## 2021-09-22 PROCEDURE — 82728 ASSAY OF FERRITIN: CPT | Performed by: FAMILY MEDICINE

## 2021-09-22 PROCEDURE — 90662 IIV NO PRSV INCREASED AG IM: CPT | Performed by: FAMILY MEDICINE

## 2021-09-22 PROCEDURE — 80050 GENERAL HEALTH PANEL: CPT | Performed by: FAMILY MEDICINE

## 2021-09-22 RX ORDER — LISINOPRIL 10 MG/1
10 TABLET ORAL DAILY
Qty: 90 TABLET | Refills: 1 | Status: SHIPPED | OUTPATIENT
Start: 2021-09-22 | End: 2021-12-08

## 2021-09-22 NOTE — PROGRESS NOTES
HPI:   Brittaney Roberts is a 73year old male who presents for an Annual Health Visit. No particular concerns or worries. Is hoping to do some serious world travel. Is up to date on covid.     Has questions concerning that, and possibilit of infec TONSILLECTOMY        Family History   Problem Relation Age of Onset   • Breast Cancer Mother 68   • Cancer Paternal Uncle       SOCIAL HISTORY   Social History    Tobacco Use      Smoking status: Never Smoker      Smokeless tobacco: Never Used    Alcohol u extremities normal, atraumatic, no cyanosis or edema  Pulses: 2+ and symmetric  Skin: Skin color, texture, turgor normal. No rashes or lesions  Lymph Nodes: Cervical, supraclavicular, and axillary nodes normal.  Neurologic: Grossly normal    ASSESSMENT AND

## 2021-09-29 RX ORDER — LEVOTHYROXINE SODIUM 0.15 MG/1
TABLET ORAL
Qty: 90 TABLET | Refills: 1 | Status: SHIPPED | OUTPATIENT
Start: 2021-09-29 | End: 2021-12-08

## 2021-10-01 ENCOUNTER — TELEMEDICINE (OUTPATIENT)
Dept: FAMILY MEDICINE CLINIC | Facility: CLINIC | Age: 71
End: 2021-10-01

## 2021-10-01 DIAGNOSIS — D50.0 IRON DEFICIENCY ANEMIA DUE TO CHRONIC BLOOD LOSS: Primary | ICD-10-CM

## 2021-10-01 DIAGNOSIS — K26.9 DUODENAL ULCER: ICD-10-CM

## 2021-10-01 PROCEDURE — 99213 OFFICE O/P EST LOW 20 MIN: CPT | Performed by: FAMILY MEDICINE

## 2021-10-01 RX ORDER — PANTOPRAZOLE SODIUM 40 MG/1
40 TABLET, DELAYED RELEASE ORAL
Qty: 90 TABLET | Refills: 0 | Status: SHIPPED | OUTPATIENT
Start: 2021-10-01 | End: 2021-12-09

## 2021-10-01 NOTE — PROGRESS NOTES
This visit is conducted using Telemedicine with live, interactive video and audio. Patient has been referred to the [facility] website at [email or web address] to review the yearly Consent to Treat document.     Patient understands and accepts financial res Take 1 tablet (10 mg total) by mouth daily. 90 tablet 1   • Albuterol Sulfate  (90 Base) MCG/ACT Inhalation Aero Soln Inhale 2 puffs into the lungs every 6 (six) hours as needed. 1 Inhaler 3   • multivitamin Oral Tab Take 1 tablet by mouth daily.

## 2021-12-08 DIAGNOSIS — D50.0 IRON DEFICIENCY ANEMIA DUE TO CHRONIC BLOOD LOSS: ICD-10-CM

## 2021-12-08 DIAGNOSIS — K26.9 DUODENAL ULCER: ICD-10-CM

## 2021-12-08 DIAGNOSIS — I10 ESSENTIAL HYPERTENSION, BENIGN: ICD-10-CM

## 2021-12-08 NOTE — TELEPHONE ENCOUNTER
lisinopril 10 MG Oral Tab     pantoprazole 40 MG Oral Tab EC     levothyroxine 150 MCG Oral Tab       Shannon       Wants 90 day refills for a whole year

## 2021-12-08 NOTE — TELEPHONE ENCOUNTER
Pt requesting refills of Lisinopril,Levothyroxine,Pantoprazole for a year  Last OV 9/22/21  Please advise.

## 2021-12-09 RX ORDER — PANTOPRAZOLE SODIUM 40 MG/1
TABLET, DELAYED RELEASE ORAL
Qty: 90 TABLET | Refills: 0 | Status: SHIPPED | OUTPATIENT
Start: 2021-12-09

## 2021-12-10 ENCOUNTER — TELEPHONE (OUTPATIENT)
Dept: FAMILY MEDICINE CLINIC | Facility: CLINIC | Age: 71
End: 2021-12-10

## 2021-12-10 RX ORDER — PANTOPRAZOLE SODIUM 40 MG/1
40 TABLET, DELAYED RELEASE ORAL
Qty: 90 TABLET | Refills: 3 | Status: SHIPPED | OUTPATIENT
Start: 2021-12-10

## 2021-12-10 RX ORDER — LISINOPRIL 10 MG/1
10 TABLET ORAL DAILY
Qty: 90 TABLET | Refills: 3 | Status: SHIPPED | OUTPATIENT
Start: 2021-12-10

## 2021-12-10 RX ORDER — LEVOTHYROXINE SODIUM 0.15 MG/1
TABLET ORAL
Qty: 90 TABLET | Refills: 3 | Status: SHIPPED | OUTPATIENT
Start: 2021-12-10

## 2022-03-23 ENCOUNTER — TELEPHONE (OUTPATIENT)
Dept: FAMILY MEDICINE CLINIC | Facility: CLINIC | Age: 72
End: 2022-03-23

## 2022-03-23 NOTE — TELEPHONE ENCOUNTER
Dr iKlo Mathew have you seen blood donation form? I see med rec scan was  printed on 3/21/22. Pt does not need lisinopril refilled he has refills on script.

## 2022-03-23 NOTE — TELEPHONE ENCOUNTER
1.  Pt wants to know if form sent here (doctor signing off pt is okay to give blood) has been completed.     2.  Needs [facility]

## 2022-03-28 ENCOUNTER — TELEMEDICINE (OUTPATIENT)
Dept: FAMILY MEDICINE CLINIC | Facility: CLINIC | Age: 72
End: 2022-03-28
Payer: COMMERCIAL

## 2022-03-28 DIAGNOSIS — R79.0 LOW FERRITIN: Primary | ICD-10-CM

## 2022-03-28 PROCEDURE — 99212 OFFICE O/P EST SF 10 MIN: CPT | Performed by: FAMILY MEDICINE

## 2022-09-29 DIAGNOSIS — K26.9 DUODENAL ULCER: ICD-10-CM

## 2022-09-29 DIAGNOSIS — D50.0 IRON DEFICIENCY ANEMIA DUE TO CHRONIC BLOOD LOSS: ICD-10-CM

## 2022-09-29 DIAGNOSIS — I10 ESSENTIAL HYPERTENSION, BENIGN: ICD-10-CM

## 2022-09-29 RX ORDER — LISINOPRIL 10 MG/1
10 TABLET ORAL DAILY
Qty: 90 TABLET | Refills: 0 | Status: SHIPPED | OUTPATIENT
Start: 2022-09-29

## 2022-09-29 RX ORDER — PANTOPRAZOLE SODIUM 40 MG/1
40 TABLET, DELAYED RELEASE ORAL
Qty: 90 TABLET | Refills: 0 | Status: SHIPPED | OUTPATIENT
Start: 2022-09-29

## 2022-09-29 RX ORDER — LEVOTHYROXINE SODIUM 0.15 MG/1
TABLET ORAL
Qty: 90 TABLET | Refills: 0 | Status: SHIPPED | OUTPATIENT
Start: 2022-09-29

## 2022-09-29 NOTE — TELEPHONE ENCOUNTER
Pt requesting refills of   Lisinopril  Levothyroxine  pantaprazole   Send  to [facility] in [place]   [address]

## 2022-10-15 ENCOUNTER — OFFICE VISIT (OUTPATIENT)
Dept: FAMILY MEDICINE CLINIC | Facility: CLINIC | Age: 72
End: 2022-10-15
Payer: MEDICARE

## 2022-10-15 ENCOUNTER — LAB ENCOUNTER (OUTPATIENT)
Dept: LAB | Age: 72
End: 2022-10-15
Attending: FAMILY MEDICINE
Payer: MEDICARE

## 2022-10-15 VITALS
SYSTOLIC BLOOD PRESSURE: 128 MMHG | OXYGEN SATURATION: 98 % | HEIGHT: 62 IN | HEART RATE: 70 BPM | DIASTOLIC BLOOD PRESSURE: 72 MMHG | BODY MASS INDEX: 25.95 KG/M2 | RESPIRATION RATE: 16 BRPM | WEIGHT: 141 LBS

## 2022-10-15 DIAGNOSIS — D50.0 IRON DEFICIENCY ANEMIA DUE TO CHRONIC BLOOD LOSS: ICD-10-CM

## 2022-10-15 DIAGNOSIS — Z12.12 SCREENING FOR COLORECTAL CANCER: ICD-10-CM

## 2022-10-15 DIAGNOSIS — L98.9 SKIN LESION: ICD-10-CM

## 2022-10-15 DIAGNOSIS — Z12.5 PROSTATE CANCER SCREENING: ICD-10-CM

## 2022-10-15 DIAGNOSIS — I10 ESSENTIAL HYPERTENSION, BENIGN: ICD-10-CM

## 2022-10-15 DIAGNOSIS — Z00.00 ENCOUNTER FOR ANNUAL HEALTH EXAMINATION: ICD-10-CM

## 2022-10-15 DIAGNOSIS — Z23 NEED FOR VACCINATION FOR STREP PNEUMONIAE: ICD-10-CM

## 2022-10-15 DIAGNOSIS — E03.9 HYPOTHYROIDISM, UNSPECIFIED TYPE: ICD-10-CM

## 2022-10-15 DIAGNOSIS — Z13.6 SCREENING FOR CARDIOVASCULAR CONDITION: ICD-10-CM

## 2022-10-15 DIAGNOSIS — I10 ESSENTIAL HYPERTENSION, BENIGN: Primary | ICD-10-CM

## 2022-10-15 DIAGNOSIS — Z12.11 SCREENING FOR COLORECTAL CANCER: ICD-10-CM

## 2022-10-15 LAB
ALBUMIN SERPL-MCNC: 3.3 G/DL (ref 3.4–5)
ALBUMIN/GLOB SERPL: 1.1 {RATIO} (ref 1–2)
ALP LIVER SERPL-CCNC: 96 U/L
ALT SERPL-CCNC: 42 U/L
ANION GAP SERPL CALC-SCNC: 6 MMOL/L (ref 0–18)
AST SERPL-CCNC: 24 U/L (ref 15–37)
BASOPHILS # BLD AUTO: 0.07 X10(3) UL (ref 0–0.2)
BASOPHILS NFR BLD AUTO: 0.7 %
BILIRUB SERPL-MCNC: 0.5 MG/DL (ref 0.1–2)
BUN BLD-MCNC: 23 MG/DL (ref 7–18)
CALCIUM BLD-MCNC: 8.7 MG/DL (ref 8.5–10.1)
CHLORIDE SERPL-SCNC: 114 MMOL/L (ref 98–112)
CHOLEST SERPL-MCNC: 180 MG/DL (ref ?–200)
CO2 SERPL-SCNC: 23 MMOL/L (ref 21–32)
COMPLEXED PSA SERPL-MCNC: 2.79 NG/ML (ref ?–4)
CREAT BLD-MCNC: 1.14 MG/DL
DEPRECATED HBV CORE AB SER IA-ACNC: 25.9 NG/ML
EOSINOPHIL # BLD AUTO: 0.19 X10(3) UL (ref 0–0.7)
EOSINOPHIL NFR BLD AUTO: 1.9 %
ERYTHROCYTE [DISTWIDTH] IN BLOOD BY AUTOMATED COUNT: 12.8 %
FASTING PATIENT LIPID ANSWER: YES
FASTING STATUS PATIENT QL REPORTED: YES
GFR SERPLBLD BASED ON 1.73 SQ M-ARVRAT: 69 ML/MIN/1.73M2 (ref 60–?)
GLOBULIN PLAS-MCNC: 3 G/DL (ref 2.8–4.4)
GLUCOSE BLD-MCNC: 94 MG/DL (ref 70–99)
HCT VFR BLD AUTO: 48.3 %
HDLC SERPL-MCNC: 35 MG/DL (ref 40–59)
HGB BLD-MCNC: 15.5 G/DL
IMM GRANULOCYTES # BLD AUTO: 0.07 X10(3) UL (ref 0–1)
IMM GRANULOCYTES NFR BLD: 0.7 %
LDLC SERPL CALC-MCNC: 119 MG/DL (ref ?–100)
LYMPHOCYTES # BLD AUTO: 0.94 X10(3) UL (ref 1–4)
LYMPHOCYTES NFR BLD AUTO: 9.2 %
MCH RBC QN AUTO: 32.2 PG (ref 26–34)
MCHC RBC AUTO-ENTMCNC: 32.1 G/DL (ref 31–37)
MCV RBC AUTO: 100.4 FL
MONOCYTES # BLD AUTO: 1.05 X10(3) UL (ref 0.1–1)
MONOCYTES NFR BLD AUTO: 10.3 %
NEUTROPHILS # BLD AUTO: 7.91 X10 (3) UL (ref 1.5–7.7)
NEUTROPHILS # BLD AUTO: 7.91 X10(3) UL (ref 1.5–7.7)
NEUTROPHILS NFR BLD AUTO: 77.2 %
NONHDLC SERPL-MCNC: 145 MG/DL (ref ?–130)
OSMOLALITY SERPL CALC.SUM OF ELEC: 299 MOSM/KG (ref 275–295)
PLATELET # BLD AUTO: 324 10(3)UL (ref 150–450)
POTASSIUM SERPL-SCNC: 4.6 MMOL/L (ref 3.5–5.1)
PROT SERPL-MCNC: 6.3 G/DL (ref 6.4–8.2)
RBC # BLD AUTO: 4.81 X10(6)UL
SODIUM SERPL-SCNC: 143 MMOL/L (ref 136–145)
TRIGL SERPL-MCNC: 146 MG/DL (ref 30–149)
VLDLC SERPL CALC-MCNC: 26 MG/DL (ref 0–30)
WBC # BLD AUTO: 10.2 X10(3) UL (ref 4–11)

## 2022-10-15 PROCEDURE — 36415 COLL VENOUS BLD VENIPUNCTURE: CPT

## 2022-10-15 PROCEDURE — 82728 ASSAY OF FERRITIN: CPT

## 2022-10-15 PROCEDURE — 80061 LIPID PANEL: CPT

## 2022-10-15 PROCEDURE — 85025 COMPLETE CBC W/AUTO DIFF WBC: CPT

## 2022-10-15 PROCEDURE — 80053 COMPREHEN METABOLIC PANEL: CPT

## 2022-12-26 DIAGNOSIS — D50.0 IRON DEFICIENCY ANEMIA DUE TO CHRONIC BLOOD LOSS: ICD-10-CM

## 2022-12-26 DIAGNOSIS — I10 ESSENTIAL HYPERTENSION, BENIGN: ICD-10-CM

## 2022-12-26 DIAGNOSIS — K26.9 DUODENAL ULCER: ICD-10-CM

## 2022-12-27 RX ORDER — PANTOPRAZOLE SODIUM 40 MG/1
TABLET, DELAYED RELEASE ORAL
Qty: 90 TABLET | Refills: 0 | Status: SHIPPED | OUTPATIENT
Start: 2022-12-27

## 2022-12-27 RX ORDER — LEVOTHYROXINE SODIUM 0.15 MG/1
TABLET ORAL
Qty: 90 TABLET | Refills: 0 | Status: SHIPPED | OUTPATIENT
Start: 2022-12-27

## 2022-12-27 RX ORDER — LISINOPRIL 10 MG/1
TABLET ORAL
Qty: 90 TABLET | Refills: 0 | Status: SHIPPED | OUTPATIENT
Start: 2022-12-27

## 2023-03-20 ENCOUNTER — TELEPHONE (OUTPATIENT)
Dept: FAMILY MEDICINE CLINIC | Facility: CLINIC | Age: 73
End: 2023-03-20

## 2023-03-20 NOTE — TELEPHONE ENCOUNTER
[address]  [facility] [id number]  Ph: [phone]  Fax: [phone]    Release form faxed and waiting on it faxed back. Please call.

## 2023-03-21 ENCOUNTER — TELEPHONE (OUTPATIENT)
Dept: FAMILY MEDICINE CLINIC | Facility: CLINIC | Age: 73
End: 2023-03-21

## 2023-03-21 NOTE — TELEPHONE ENCOUNTER
At [facility] now, and they still haven't received forms. PT states he will lose money if form is not faxed today. Please advise.

## 2023-03-21 NOTE — TELEPHONE ENCOUNTER
Spoke with patient advising we did not receive any paper work yesterday or today, patient expresses frustration and will call back.

## 2023-03-21 NOTE — TELEPHONE ENCOUNTER
Put in [facility] office for signature, will need to be faxed back today.

## 2023-03-26 DIAGNOSIS — K26.9 DUODENAL ULCER: ICD-10-CM

## 2023-03-26 DIAGNOSIS — I10 ESSENTIAL HYPERTENSION, BENIGN: ICD-10-CM

## 2023-03-26 DIAGNOSIS — D50.0 IRON DEFICIENCY ANEMIA DUE TO CHRONIC BLOOD LOSS: ICD-10-CM

## 2023-03-27 RX ORDER — LEVOTHYROXINE SODIUM 0.15 MG/1
TABLET ORAL
Qty: 90 TABLET | Refills: 0 | Status: SHIPPED | OUTPATIENT
Start: 2023-03-27

## 2023-03-27 RX ORDER — LISINOPRIL 10 MG/1
TABLET ORAL
Qty: 90 TABLET | Refills: 0 | Status: SHIPPED | OUTPATIENT
Start: 2023-03-27

## 2023-03-27 RX ORDER — PANTOPRAZOLE SODIUM 40 MG/1
TABLET, DELAYED RELEASE ORAL
Qty: 90 TABLET | Refills: 0 | Status: SHIPPED | OUTPATIENT
Start: 2023-03-27

## 2023-06-13 ENCOUNTER — LAB ENCOUNTER (OUTPATIENT)
Dept: LAB | Age: 73
End: 2023-06-13
Payer: MEDICARE

## 2023-06-13 DIAGNOSIS — D50.0 IRON DEFICIENCY ANEMIA DUE TO CHRONIC BLOOD LOSS: ICD-10-CM

## 2023-06-13 DIAGNOSIS — R79.0 LOW FERRITIN: ICD-10-CM

## 2023-06-13 DIAGNOSIS — I10 ESSENTIAL HYPERTENSION, BENIGN: ICD-10-CM

## 2023-06-13 DIAGNOSIS — K26.9 DUODENAL ULCER: ICD-10-CM

## 2023-06-13 DIAGNOSIS — R71.8 ELEVATED MCV: ICD-10-CM

## 2023-06-13 LAB
BASOPHILS # BLD AUTO: 0.05 X10(3) UL (ref 0–0.2)
BASOPHILS NFR BLD AUTO: 0.6 %
DEPRECATED HBV CORE AB SER IA-ACNC: 30.1 NG/ML
EOSINOPHIL # BLD AUTO: 0.31 X10(3) UL (ref 0–0.7)
EOSINOPHIL NFR BLD AUTO: 3.8 %
ERYTHROCYTE [DISTWIDTH] IN BLOOD BY AUTOMATED COUNT: 12.4 %
FOLATE SERPL-MCNC: 12 NG/ML (ref 8.7–?)
HCT VFR BLD AUTO: 48.7 %
HGB BLD-MCNC: 15.9 G/DL
IMM GRANULOCYTES # BLD AUTO: 0.06 X10(3) UL (ref 0–1)
IMM GRANULOCYTES NFR BLD: 0.7 %
IRON SATN MFR SERPL: 29 %
IRON SERPL-MCNC: 128 UG/DL
LYMPHOCYTES # BLD AUTO: 0.97 X10(3) UL (ref 1–4)
LYMPHOCYTES NFR BLD AUTO: 11.9 %
MCH RBC QN AUTO: 32.4 PG (ref 26–34)
MCHC RBC AUTO-ENTMCNC: 32.6 G/DL (ref 31–37)
MCV RBC AUTO: 99.4 FL
MONOCYTES # BLD AUTO: 0.8 X10(3) UL (ref 0.1–1)
MONOCYTES NFR BLD AUTO: 9.8 %
NEUTROPHILS # BLD AUTO: 5.96 X10 (3) UL (ref 1.5–7.7)
NEUTROPHILS # BLD AUTO: 5.96 X10(3) UL (ref 1.5–7.7)
NEUTROPHILS NFR BLD AUTO: 73.2 %
PLATELET # BLD AUTO: 332 10(3)UL (ref 150–450)
RBC # BLD AUTO: 4.9 X10(6)UL
TIBC SERPL-MCNC: 437 UG/DL (ref 240–450)
TRANSFERRIN SERPL-MCNC: 293 MG/DL (ref 200–360)
VIT B12 SERPL-MCNC: 1392 PG/ML (ref 193–986)
WBC # BLD AUTO: 8.2 X10(3) UL (ref 4–11)

## 2023-06-13 PROCEDURE — 36415 COLL VENOUS BLD VENIPUNCTURE: CPT

## 2023-06-13 PROCEDURE — 82746 ASSAY OF FOLIC ACID SERUM: CPT

## 2023-06-13 PROCEDURE — 83550 IRON BINDING TEST: CPT

## 2023-06-13 PROCEDURE — 85025 COMPLETE CBC W/AUTO DIFF WBC: CPT

## 2023-06-13 PROCEDURE — 83540 ASSAY OF IRON: CPT

## 2023-06-13 PROCEDURE — 82607 VITAMIN B-12: CPT

## 2023-06-13 PROCEDURE — 82728 ASSAY OF FERRITIN: CPT

## 2023-06-13 RX ORDER — LEVOTHYROXINE SODIUM 0.15 MG/1
150 TABLET ORAL
Qty: 90 TABLET | Refills: 0 | Status: SHIPPED | OUTPATIENT
Start: 2023-06-13 | End: 2023-06-15

## 2023-06-13 RX ORDER — LISINOPRIL 10 MG/1
TABLET ORAL
Qty: 90 TABLET | Refills: 0 | OUTPATIENT
Start: 2023-06-13

## 2023-06-13 RX ORDER — LEVOTHYROXINE SODIUM 0.15 MG/1
TABLET ORAL
Qty: 90 TABLET | Refills: 0 | OUTPATIENT
Start: 2023-06-13

## 2023-06-13 RX ORDER — PANTOPRAZOLE SODIUM 40 MG/1
TABLET, DELAYED RELEASE ORAL
Qty: 90 TABLET | Refills: 0 | OUTPATIENT
Start: 2023-06-13

## 2023-06-13 RX ORDER — LISINOPRIL 10 MG/1
10 TABLET ORAL DAILY
Qty: 90 TABLET | Refills: 0 | Status: SHIPPED | OUTPATIENT
Start: 2023-06-13 | End: 2023-06-15

## 2023-06-13 RX ORDER — PANTOPRAZOLE SODIUM 40 MG/1
40 TABLET, DELAYED RELEASE ORAL
Qty: 90 TABLET | Refills: 0 | Status: SHIPPED | OUTPATIENT
Start: 2023-06-13 | End: 2023-06-15

## 2023-06-13 NOTE — TELEPHONE ENCOUNTER
Refill request    Last OV: 10/15/2022  Last refill request: 3/27/2023    Medication pended for review

## 2023-06-13 NOTE — TELEPHONE ENCOUNTER
Early refill pt is traveling to [facility] for 2 months  Levothyroxine  Pantoprazole  Lisinopril

## 2023-06-13 NOTE — TELEPHONE ENCOUNTER
- PANTOPRAZOLE 40 MG Oral Tab EC [Pharmacy Med Name: Pantoprazole Sodium 40 MG Oral Tablet Delayed Release]   - LISINOPRIL 10 MG Oral Tab [Pharmacy Med Name: Lisinopril 10 MG Oral Tablet]   - LEVOTHYROXINE 150 MCG Oral Tab [Pharmacy Med Name: Levothyroxine Sodium 150 MCG Oral Tablet]      Flight leaves Monday for [facility] [place] - [address] [phone], [phone]   [address] [facility]     Phone: [phone] Fax: [phone]

## 2023-06-15 DIAGNOSIS — D50.0 IRON DEFICIENCY ANEMIA DUE TO CHRONIC BLOOD LOSS: ICD-10-CM

## 2023-06-15 DIAGNOSIS — K26.9 DUODENAL ULCER: ICD-10-CM

## 2023-06-15 DIAGNOSIS — I10 ESSENTIAL HYPERTENSION, BENIGN: ICD-10-CM

## 2023-06-15 RX ORDER — LEVOTHYROXINE SODIUM 0.15 MG/1
TABLET ORAL
Qty: 90 TABLET | Refills: 0 | Status: SHIPPED | OUTPATIENT
Start: 2023-06-15

## 2023-06-15 RX ORDER — PANTOPRAZOLE SODIUM 40 MG/1
TABLET, DELAYED RELEASE ORAL
Qty: 90 TABLET | Refills: 0 | Status: SHIPPED | OUTPATIENT
Start: 2023-06-15

## 2023-06-15 RX ORDER — LISINOPRIL 10 MG/1
TABLET ORAL
Qty: 90 TABLET | Refills: 0 | Status: SHIPPED | OUTPATIENT
Start: 2023-06-15

## 2023-08-07 PROBLEM — Z86.0101 HISTORY OF ADENOMATOUS POLYP OF COLON: Status: ACTIVE | Noted: 2023-08-07

## 2023-08-07 PROBLEM — K29.60 PHLEGMONOUS GASTRITIS: Status: ACTIVE | Noted: 2023-08-07

## 2023-08-07 PROBLEM — D12.5 BENIGN NEOPLASM OF SIGMOID COLON: Status: ACTIVE | Noted: 2023-08-07

## 2023-08-07 PROBLEM — K22.10 ESOPHAGEAL ULCER: Status: ACTIVE | Noted: 2023-08-07

## 2023-08-07 PROBLEM — K64.8 INTERNAL HEMORRHOIDS: Status: ACTIVE | Noted: 2023-08-07

## 2023-08-07 PROBLEM — D12.3 BENIGN NEOPLASM OF TRANSVERSE COLON: Status: ACTIVE | Noted: 2023-08-07

## 2023-08-07 PROBLEM — R19.4 CHANGE IN BOWEL HABITS: Status: ACTIVE | Noted: 2023-08-07

## 2023-08-07 PROBLEM — R14.1 ABDOMINAL GAS PAIN: Status: ACTIVE | Noted: 2023-08-07

## 2023-08-07 PROBLEM — Z86.010 HISTORY OF ADENOMATOUS POLYP OF COLON: Status: ACTIVE | Noted: 2023-08-07

## 2023-08-07 PROBLEM — R19.4 CHANGE IN BOWEL HABITS: Status: RESOLVED | Noted: 2023-08-07 | Resolved: 2023-08-07

## 2023-08-07 PROBLEM — R14.1 ABDOMINAL GAS PAIN: Status: RESOLVED | Noted: 2023-08-07 | Resolved: 2023-08-07

## 2023-08-07 PROBLEM — K22.70 BARRETT'S ESOPHAGUS WITHOUT DYSPLASIA: Status: ACTIVE | Noted: 2023-08-07

## 2023-08-07 PROBLEM — D12.2 BENIGN NEOPLASM OF ASCENDING COLON: Status: ACTIVE | Noted: 2023-08-07

## 2023-10-06 ENCOUNTER — LAB ENCOUNTER (OUTPATIENT)
Dept: LAB | Age: 73
End: 2023-10-06
Attending: INTERNAL MEDICINE
Payer: MEDICARE

## 2023-10-06 DIAGNOSIS — D50.0 IRON DEFICIENCY ANEMIA DUE TO CHRONIC BLOOD LOSS: ICD-10-CM

## 2023-10-06 LAB
BASOPHILS # BLD AUTO: 0.05 X10(3) UL (ref 0–0.2)
BASOPHILS NFR BLD AUTO: 0.6 %
DEPRECATED HBV CORE AB SER IA-ACNC: 43.8 NG/ML
EOSINOPHIL # BLD AUTO: 0.15 X10(3) UL (ref 0–0.7)
EOSINOPHIL NFR BLD AUTO: 1.7 %
ERYTHROCYTE [DISTWIDTH] IN BLOOD BY AUTOMATED COUNT: 12.9 %
HCT VFR BLD AUTO: 45.4 %
HGB BLD-MCNC: 15.1 G/DL
IMM GRANULOCYTES # BLD AUTO: 0.05 X10(3) UL (ref 0–1)
IMM GRANULOCYTES NFR BLD: 0.6 %
IRON SATN MFR SERPL: 27 %
IRON SERPL-MCNC: 98 UG/DL
LYMPHOCYTES # BLD AUTO: 0.91 X10(3) UL (ref 1–4)
LYMPHOCYTES NFR BLD AUTO: 10.6 %
MCH RBC QN AUTO: 32.3 PG (ref 26–34)
MCHC RBC AUTO-ENTMCNC: 33.3 G/DL (ref 31–37)
MCV RBC AUTO: 97 FL
MONOCYTES # BLD AUTO: 0.67 X10(3) UL (ref 0.1–1)
MONOCYTES NFR BLD AUTO: 7.8 %
NEUTROPHILS # BLD AUTO: 6.75 X10 (3) UL (ref 1.5–7.7)
NEUTROPHILS # BLD AUTO: 6.75 X10(3) UL (ref 1.5–7.7)
NEUTROPHILS NFR BLD AUTO: 78.7 %
PLATELET # BLD AUTO: 314 10(3)UL (ref 150–450)
RBC # BLD AUTO: 4.68 X10(6)UL
TIBC SERPL-MCNC: 367 UG/DL (ref 240–450)
TRANSFERRIN SERPL-MCNC: 246 MG/DL (ref 200–360)
WBC # BLD AUTO: 8.6 X10(3) UL (ref 4–11)

## 2023-10-06 PROCEDURE — 82728 ASSAY OF FERRITIN: CPT

## 2023-10-06 PROCEDURE — 85025 COMPLETE CBC W/AUTO DIFF WBC: CPT

## 2023-10-06 PROCEDURE — 83550 IRON BINDING TEST: CPT

## 2023-10-06 PROCEDURE — 83540 ASSAY OF IRON: CPT

## 2023-10-06 PROCEDURE — 36415 COLL VENOUS BLD VENIPUNCTURE: CPT

## 2024-01-03 ENCOUNTER — LAB ENCOUNTER (OUTPATIENT)
Dept: LAB | Age: 74
End: 2024-01-03
Attending: INTERNAL MEDICINE
Payer: MEDICARE

## 2024-01-03 DIAGNOSIS — K90.0 CELIAC DISEASE: ICD-10-CM

## 2024-01-03 PROCEDURE — 86364 TISS TRNSGLTMNASE EA IG CLAS: CPT

## 2024-01-03 PROCEDURE — 36415 COLL VENOUS BLD VENIPUNCTURE: CPT

## 2024-01-04 LAB — TTG IGA SER-ACNC: 0.9 U/ML (ref ?–7)

## 2024-01-16 DIAGNOSIS — I10 ESSENTIAL HYPERTENSION, BENIGN: ICD-10-CM

## 2024-01-16 DIAGNOSIS — K26.9 DUODENAL ULCER: ICD-10-CM

## 2024-01-16 DIAGNOSIS — D50.0 IRON DEFICIENCY ANEMIA DUE TO CHRONIC BLOOD LOSS: ICD-10-CM

## 2024-01-16 RX ORDER — LISINOPRIL 10 MG/1
10 TABLET ORAL DAILY
Qty: 90 TABLET | Refills: 0 | Status: SHIPPED | OUTPATIENT
Start: 2024-01-16

## 2024-01-16 RX ORDER — LEVOTHYROXINE SODIUM 0.15 MG/1
150 TABLET ORAL
Qty: 90 TABLET | Refills: 0 | Status: SHIPPED | OUTPATIENT
Start: 2024-01-16

## 2024-01-16 RX ORDER — PANTOPRAZOLE SODIUM 40 MG/1
40 TABLET, DELAYED RELEASE ORAL
Qty: 90 TABLET | Refills: 0 | Status: SHIPPED | OUTPATIENT
Start: 2024-01-16

## 2024-01-16 NOTE — TELEPHONE ENCOUNTER
.A refill request was received for:  Requested Prescriptions     Pending Prescriptions Disp Refills    LEVOTHYROXINE 150 MCG Oral Tab [Pharmacy Med Name: Levothyroxine Sodium 150 MCG Oral Tablet] 90 tablet 0     Sig: TAKE 1 TABLET BY MOUTH BEFORE BREAKFAST    LISINOPRIL 10 MG Oral Tab [Pharmacy Med Name: Lisinopril 10 MG Oral Tablet] 90 tablet 0     Sig: Take 1 tablet by mouth once daily    PANTOPRAZOLE 40 MG Oral Tab EC [Pharmacy Med Name: Pantoprazole Sodium 40 MG Oral Tablet Delayed Release] 90 tablet 0     Sig: TAKE 1 TABLET BY MOUTH BEFORE BREAKFAST       Last refill date:   6/15/2023    Last office visit: 10/15/2022    Follow up due:  Future Appointments   Date Time Provider Department Center   2/5/2024  4:30 PM Reese Mendoza MD [facility] [address]

## 2024-02-05 ENCOUNTER — OFFICE VISIT (OUTPATIENT)
Dept: FAMILY MEDICINE CLINIC | Facility: CLINIC | Age: 74
End: 2024-02-05
Payer: MEDICARE

## 2024-02-05 VITALS
RESPIRATION RATE: 16 BRPM | HEIGHT: 62 IN | BODY MASS INDEX: 26.31 KG/M2 | HEART RATE: 76 BPM | WEIGHT: 143 LBS | SYSTOLIC BLOOD PRESSURE: 120 MMHG | OXYGEN SATURATION: 98 % | DIASTOLIC BLOOD PRESSURE: 74 MMHG

## 2024-02-05 DIAGNOSIS — E03.9 HYPOTHYROIDISM, UNSPECIFIED TYPE: ICD-10-CM

## 2024-02-05 DIAGNOSIS — K22.70 BARRETT'S ESOPHAGUS WITHOUT DYSPLASIA: ICD-10-CM

## 2024-02-05 DIAGNOSIS — D50.0 IRON DEFICIENCY ANEMIA DUE TO CHRONIC BLOOD LOSS: ICD-10-CM

## 2024-02-05 DIAGNOSIS — Z13.6 SCREENING FOR CARDIOVASCULAR CONDITION: ICD-10-CM

## 2024-02-05 DIAGNOSIS — Z86.010 HISTORY OF ADENOMATOUS POLYP OF COLON: ICD-10-CM

## 2024-02-05 DIAGNOSIS — J45.20 MILD INTERMITTENT ASTHMA WITHOUT COMPLICATION: ICD-10-CM

## 2024-02-05 DIAGNOSIS — Z00.00 ENCOUNTER FOR ANNUAL HEALTH EXAMINATION: Primary | ICD-10-CM

## 2024-02-05 DIAGNOSIS — N40.1 BENIGN PROSTATIC HYPERPLASIA WITH URINARY FREQUENCY: ICD-10-CM

## 2024-02-05 DIAGNOSIS — Z12.5 PROSTATE CANCER SCREENING: ICD-10-CM

## 2024-02-05 DIAGNOSIS — R35.0 BENIGN PROSTATIC HYPERPLASIA WITH URINARY FREQUENCY: ICD-10-CM

## 2024-02-05 DIAGNOSIS — I10 ESSENTIAL HYPERTENSION, BENIGN: ICD-10-CM

## 2024-02-05 PROBLEM — D12.2 BENIGN NEOPLASM OF ASCENDING COLON: Status: RESOLVED | Noted: 2023-08-07 | Resolved: 2024-02-05

## 2024-02-05 PROBLEM — L25.5 PLANT DERMATITIS: Status: RESOLVED | Noted: 2020-07-31 | Resolved: 2024-02-05

## 2024-02-05 PROBLEM — D12.5 BENIGN NEOPLASM OF SIGMOID COLON: Status: RESOLVED | Noted: 2023-08-07 | Resolved: 2024-02-05

## 2024-02-05 PROBLEM — D12.3 BENIGN NEOPLASM OF TRANSVERSE COLON: Status: RESOLVED | Noted: 2023-08-07 | Resolved: 2024-02-05

## 2024-02-05 RX ORDER — ALBUTEROL SULFATE 90 UG/1
2 AEROSOL, METERED RESPIRATORY (INHALATION) EVERY 6 HOURS PRN
Qty: 1 EACH | Refills: 3 | Status: SHIPPED | OUTPATIENT
Start: 2024-02-05

## 2024-02-05 RX ORDER — TAMSULOSIN HYDROCHLORIDE 0.4 MG/1
0.4 CAPSULE ORAL EVERY EVENING
Qty: 30 CAPSULE | Refills: 0 | Status: SHIPPED | OUTPATIENT
Start: 2024-02-05 | End: 2024-03-06

## 2024-02-05 NOTE — PATIENT INSTRUCTIONS
Victor Manuel Sharma's SCREENING SCHEDULE   Tests on this list are recommended by your physician but may not be covered, or covered at this frequency, by your insurer.   Please check with your insurance carrier before scheduling to verify coverage.   PREVENTATIVE SERVICES FREQUENCY &  COVERAGE DETAILS LAST COMPLETION DATE   Diabetes Screening    Fasting Blood Sugar / Glucose    One screening every 12 months if never tested or if previously tested but not diagnosed with pre-diabetes   One screening every 6 months if diagnosed with pre-diabetes Lab Results   Component Value Date    GLU 94 10/15/2022        Cardiovascular Disease Screening    Lipid Panel  Cholesterol  Lipoprotein (HDL)  Triglycerides Covered every 5 years for all Medicare beneficiaries without apparent signs or symptoms of cardiovascular disease Lab Results   Component Value Date    CHOLEST 180 10/15/2022    HDL 35 (L) 10/15/2022     (H) 10/15/2022    TRIG 146 10/15/2022         Electrocardiogram (EKG)   Covered if needed at Welcome to Medicare, and non-screening if indicated for medical reasons 05/20/2016      Ultrasound Screening for Abdominal Aortic Aneurysm (AAA) Covered once in a lifetime for one of the following risk factors   • Men who are 65-75 years old and have ever smoked   • Anyone with a family history -     Colorectal Cancer Screening  Covered for ages 50-85; only need ONE of the following:    Colonoscopy   Covered every 10 years    Covered every 2 years if patient is at high risk or previous colonoscopy was abnormal 08/07/2023    Health Maintenance   Topic Date Due   • Colorectal Cancer Screening  08/07/2030       Flexible Sigmoidoscopy   Covered every 4 years -    Fecal Occult Blood Test Covered annually -   Prostate Cancer Screening    Prostate-Specific Antigen (PSA) Annually Lab Results   Component Value Date    PSA 2.000 08/11/2017     Health Maintenance   Topic Date Due   • PSA  10/15/2024      Immunizations    Influenza Covered  once per flu season  Please get every year -  Influenza Vaccine(1) due on 10/01/2023    Pneumococcal Each vaccine ([id number] & [id number]) covered once after 65 Prevnar 13: 05/31/2018    [id number]: 06/28/2011     No recommendations at this time    Hepatitis B One screening covered for patients with certain risk factors   -  No recommendations at this time    Tetanus Toxoid Not covered by Medicare Part B unless medically necessary (cut with metal); may be covered with your pharmacy prescription benefits -    Tetanus, Diptheria and Pertusis TD and TDaP Not covered by Medicare Part B -  No recommendations at this time    Zoster Not covered by Medicare Part B; may be covered with your pharmacy  prescription benefits 06/28/2011  No recommendations at this time     Annual Monitoring of Persistent Medications (ACE/ARB, digoxin diuretics, anticonvulsants)    Potassium Annually Lab Results   Component Value Date    K 4.6 10/15/2022         Creatinine   Annually Lab Results   Component Value Date    CREATSERUM 1.14 10/15/2022         BUN Annually Lab Results   Component Value Date    BUN 23 (H) 10/15/2022       Drug Serum Conc Annually No results found for: \"DIGOXIN\", \"DIG\", \"VALP\"

## 2024-02-05 NOTE — PROGRESS NOTES
Subjective:   Victor Manuel Sharma is a 73 year old male who presents for a Medicare Subsequent Annual Wellness visit (Pt already had Initial Annual Wellness) and scheduled follow up of multiple significant but stable problems. Has some follow up he'd like on some tests.    Urinary issues, can urinate fine when needs too, stream seems ok to him.    Overall doing well    History/Other:   Fall Risk Assessment:   He has been screened for Falls and is low risk.      Cognitive Assessment:   He had a completely normal cognitive assessment - see flowsheet entries     Functional Ability/Status:   Victor Manuel Sharma has a completely normal functional assessment. See flowsheet for details.        Depression Screening (PHQ-2/PHQ-9): PHQ-2 SCORE: 0  , done 2/5/2024          Advanced Directives:   He does NOT have a Living Will. [Do you have a living will?: No]  He does NOT have a Power of  for Health Care. [Do you have a healthcare power of ?: No]  Discussed Advance Care Planning with patient (and family/surrogate if present). Standard forms made available to patient in After Visit Summary.      Patient Active Problem List   Diagnosis    Essential hypertension, benign    Erectile dysfunction    Presbycusis    Hypothyroidism    Richmond's esophagus without dysplasia    History of adenomatous polyp of colon     Allergies:  He has No Known Allergies.    Current Medications:  Outpatient Medications Marked as Taking for the 2/5/24 encounter (Office Visit) with Reese Mendoza MD   Medication Sig    albuterol 108 (90 Base) MCG/ACT Inhalation Aero Soln Inhale 2 puffs into the lungs every 6 (six) hours as needed.    tamsulosin 0.4 MG Oral Cap Take 1 capsule (0.4 mg total) by mouth every evening.    levothyroxine 150 MCG Oral Tab Take 1 tablet (150 mcg total) by mouth before breakfast.    lisinopril 10 MG Oral Tab Take 1 tablet (10 mg total) by mouth daily.    pantoprazole 40 MG Oral Tab EC Take 1 tablet (40 mg total) by mouth  before breakfast.    multivitamin Oral Tab Take 1 tablet by mouth daily.    Cholecalciferol (VITAMIN D) 1000 units Oral Tab Take 1 capsule by mouth daily.    Omega-3 Fatty Acids (FISH OIL) 1200 MG Oral Capsule Delayed Release Take by mouth daily.       Medical History:  He  has a past medical history of Abdominal hernia, Anal polyp, Anemia, Benign neoplasm of ascending colon, Benign neoplasm of colon, Benign neoplasm of sigmoid colon, Benign neoplasm of transverse colon, Disorder of thyroid, Heart palpitations, Hemorrhoids, High blood pressure, Hypothyroidism, Inguinal hernia bilateral, non-recurrent, Iron deficiency anemia due to chronic blood loss, Leaking of urine, Tubular adenoma of colon, Unspecified cataract, Visual impairment, and Wheezing.  Surgical History:  He  has a past surgical history that includes colonoscopy (2004); appendectomy; tonsillectomy; hernia surgery (05/2016); hemorrhoidectomy; other surgical history; hemorrhoidectomy,int/ext,simple; and egd.   Family History:  His family history includes Breast Cancer (age of onset: 76) in his mother; Cancer in his paternal uncle; Hypertension in his father; Stroke in his father.  Social History:  He  reports that he has never smoked. He has never used smokeless tobacco. He reports current alcohol use of about 3.0 standard drinks of alcohol per week. He reports that he does not use drugs.    Tobacco:  He has never smoked tobacco.    CAGE Alcohol Screen:   CAGE screening score of 0 on 2/5/2024, showing low risk of alcohol abuse.      Patient Care Team:  Rome Lang DO as PCP - General (GASTROENTEROLOGY)  Rome Lang DO (GASTROENTEROLOGY)  Ajay Sandoavl APRN (Nurse Practitioner)    Review of Systems  Negative except as noted in HPI    Objective:   Physical Exam  General Appearance:  Alert, cooperative, no distress, appears stated age   Head:  Normocephalic, without obvious abnormality, atraumatic   Eyes:  PERRL, conjunctiva/corneas  clear, EOM's intact, both eyes   Ears:  Normal TM's and external ear canals, both ears   Nose: Nares normal, septum midline, mucosa normal, no drainage or sinus tenderness   Throat: Lips, mucosa, and tongue normal; teeth and gums normal   Neck: Supple, symmetrical, trachea midline, no adenopathy, thyroid: not enlarged, symmetric, no tenderness/mass/nodules, no carotid bruit or JVD   Back:   Symmetric, no curvature, ROM normal, no CVA tenderness   Lungs:   Clear to auscultation bilaterally, respirations unlabored   Chest Wall:  No tenderness or deformity   Heart:  Regular rate and rhythm, S1, S2 normal, no murmur, rub or gallop   Abdomen:   Soft, non-tender, bowel sounds active all four quadrants,  no masses, no organomegaly           Extremities: Extremities normal, atraumatic, no cyanosis or edema   Pulses: 2+ and symmetric   Skin: Skin color, texture, turgor normal, no rashes or lesions   Lymph nodes: Cervical, supraclavicular, and axillary nodes normal   Neurologic: Normal     /74   Pulse 76   Resp 16   Ht 5' 2\" (1.575 m)   Wt 143 lb (64.9 kg)   SpO2 98%   BMI 26.16 kg/m²  Estimated body mass index is 26.16 kg/m² as calculated from the following:    Height as of this encounter: 5' 2\" (1.575 m).    Weight as of this encounter: 143 lb (64.9 kg).    Medicare Hearing Assessment:   Hearing Screening    Screening Method: Finger Rub  Finger Rub Result: Pass               Visual Acuity:   Right Eye Visual Acuity: Corrected Right Eye Chart Acuity: 20/20   Left Eye Visual Acuity: Corrected Left Eye Chart Acuity: 20/20   Both Eyes Visual Acuity: Corrected Both Eyes Chart Acuity: 20/20   Able To Tolerate Visual Acuity: Yes        Assessment & Plan:   Victor Manuel Sharma is a 73 year old male who presents for a Medicare Assessment.     1. Encounter for annual health examination (Primary)  -     PSA Total, Screen; Future; Expected date: 02/05/2024  -     Lipid Panel; Future; Expected date: 02/05/2024  -     Comp  Metabolic Panel (14); Future; Expected date: 02/05/2024  -     CBC With Differential With Platelet; Future; Expected date: 02/05/2024  2. Mild intermittent asthma without complication - Stable, controlled, with albuterol prn  -     Albuterol Sulfate HFA; Inhale 2 puffs into the lungs every 6 (six) hours as needed.  Dispense: 1 each; Refill: 3  3. Essential hypertension, benign - Controlled, will continue current medications  -     Comp Metabolic Panel (14); Future; Expected date: 02/05/2024  4. History of adenomatous polyp of colon - Is up to date with colon cancer screening  5. Iron deficiency anemia due to chronic blood loss - Controlled,if normal on current labs will resolve  -     CBC With Differential With Platelet; Future; Expected date: 02/05/2024  -     Ferritin; Future; Expected date: 02/05/2024  6. Hypothyroidism, unspecified type- Has been controlled, rechecking labs, and will continue or adjust medication accordingly  -     TSH and Free T4; Future; Expected date: 02/05/2024  7. Prostate cancer screening  -     PSA Total, Screen; Future; Expected date: 02/05/2024  8. Screening for cardiovascular condition  -     Lipid Panel; Future; Expected date: 02/05/2024  9. Richmond's esophagus without dysplasia - Follow up in 5 years, had endoscopy late last year.  10. Benign prostatic hyperplasia with urinary frequency  -     Tamsulosin HCl; Take 1 capsule (0.4 mg total) by mouth every evening.  Dispense: 30 capsule; Refill: 0  Other orders  -     High Dose Fluzone 65 yr and older PFS [69942]    The patient indicates understanding of these issues and agrees to the plan.  Lab work ordered.  Reinforced healthy diet, lifestyle, and exercise.      No follow-ups on file.     Reese Mendoza MD, 2/5/2024     Supplementary Documentation:   General Health:  In the past six months, have you lost more than 10 pounds without trying?: 2 - No  Has your appetite been poor?: No  Type of Diet: Balanced  How does the patient maintain  a good energy level?: Appropriate Exercise;Daily Walks;Stretching  How would you describe your daily physical activity?: Moderate  How would you describe your current health state?: Good  How do you maintain positive mental well-being?: Social Interaction  On a scale of 0 to 10, with 0 being no pain and 10 being severe pain, what is your pain level?: 0 - (None)  In the past six months, have you experienced urine leakage?: 1-Yes  At any time do you feel concerned for the safety/well-being of yourself and/or your children, in your home or elsewhere?: No  Have you had any immunizations at another office such as Influenza, Hepatitis B, Tetanus, or Pneumococcal?: Yes          Victor Manuel Sharma's SCREENING SCHEDULE   Tests on this list are recommended by your physician but may not be covered, or covered at this frequency, by your insurer.   Please check with your insurance carrier before scheduling to verify coverage.   PREVENTATIVE SERVICES FREQUENCY &  COVERAGE DETAILS LAST COMPLETION DATE   Diabetes Screening    Fasting Blood Sugar / Glucose    One screening every 12 months if never tested or if previously tested but not diagnosed with pre-diabetes   One screening every 6 months if diagnosed with pre-diabetes Lab Results   Component Value Date    GLU 94 10/15/2022        Cardiovascular Disease Screening    Lipid Panel  Cholesterol  Lipoprotein (HDL)  Triglycerides Covered every 5 years for all Medicare beneficiaries without apparent signs or symptoms of cardiovascular disease Lab Results   Component Value Date    CHOLEST 180 10/15/2022    HDL 35 (L) 10/15/2022     (H) 10/15/2022    TRIG 146 10/15/2022         Electrocardiogram (EKG)   Covered if needed at Welcome to Medicare, and non-screening if indicated for medical reasons 05/20/2016      Ultrasound Screening for Abdominal Aortic Aneurysm (AAA) Covered once in a lifetime for one of the following risk factors    Men who are 65-75 years old and have ever smoked     Anyone with a family history -     Colorectal Cancer Screening  Covered for ages 50-85; only need ONE of the following:    Colonoscopy   Covered every 10 years    Covered every 2 years if patient is at high risk or previous colonoscopy was abnormal 08/07/2023    Health Maintenance   Topic Date Due    Colorectal Cancer Screening  08/07/2030       Flexible Sigmoidoscopy   Covered every 4 years -    Fecal Occult Blood Test Covered annually -   Prostate Cancer Screening    Prostate-Specific Antigen (PSA) Annually Lab Results   Component Value Date    PSA 2.000 08/11/2017     Health Maintenance   Topic Date Due    PSA  10/15/2024      Immunizations    Influenza Covered once per flu season  Please get every year 02/05/2024  No recommendations at this time    Pneumococcal Each vaccine ([id number] & [id number]) covered once after 65 Prevnar 13: 05/31/2018    [id number]: 06/28/2011     No recommendations at this time    Hepatitis B One screening covered for patients with certain risk factors   -  No recommendations at this time    Tetanus Toxoid Not covered by Medicare Part B unless medically necessary (cut with metal); may be covered with your pharmacy prescription benefits -    Tetanus, Diptheria and Pertusis TD and TDaP Not covered by Medicare Part B -  No recommendations at this time    Zoster Not covered by Medicare Part B; may be covered with your pharmacy  prescription benefits 06/28/2011  No recommendations at this time     Annual Monitoring of Persistent Medications (ACE/ARB, digoxin diuretics, anticonvulsants)    Potassium Annually Lab Results   Component Value Date    K 4.6 10/15/2022         Creatinine   Annually Lab Results   Component Value Date    CREATSERUM 1.14 10/15/2022         BUN Annually Lab Results   Component Value Date    BUN 23 (H) 10/15/2022       Drug Serum Conc Annually No results found for: \"DIGOXIN\", \"DIG\", \"VALP\"

## 2024-02-17 ENCOUNTER — LAB ENCOUNTER (OUTPATIENT)
Dept: LAB | Age: 74
End: 2024-02-17
Attending: FAMILY MEDICINE
Payer: MEDICARE

## 2024-02-17 DIAGNOSIS — D50.0 IRON DEFICIENCY ANEMIA DUE TO CHRONIC BLOOD LOSS: ICD-10-CM

## 2024-02-17 DIAGNOSIS — Z13.6 SCREENING FOR CARDIOVASCULAR CONDITION: ICD-10-CM

## 2024-02-17 DIAGNOSIS — Z12.5 PROSTATE CANCER SCREENING: ICD-10-CM

## 2024-02-17 DIAGNOSIS — Z00.00 ENCOUNTER FOR ANNUAL HEALTH EXAMINATION: ICD-10-CM

## 2024-02-17 DIAGNOSIS — I10 ESSENTIAL HYPERTENSION, BENIGN: ICD-10-CM

## 2024-02-17 DIAGNOSIS — E03.9 HYPOTHYROIDISM, UNSPECIFIED TYPE: ICD-10-CM

## 2024-02-17 LAB
ALBUMIN SERPL-MCNC: 3.4 G/DL (ref 3.4–5)
ALBUMIN/GLOB SERPL: 1.2 {RATIO} (ref 1–2)
ALP LIVER SERPL-CCNC: 85 U/L
ALT SERPL-CCNC: 60 U/L
ANION GAP SERPL CALC-SCNC: 3 MMOL/L (ref 0–18)
AST SERPL-CCNC: 35 U/L (ref 15–37)
BASOPHILS # BLD AUTO: 0.07 X10(3) UL (ref 0–0.2)
BASOPHILS NFR BLD AUTO: 0.7 %
BILIRUB SERPL-MCNC: 0.4 MG/DL (ref 0.1–2)
BUN BLD-MCNC: 27 MG/DL (ref 9–23)
CALCIUM BLD-MCNC: 9.1 MG/DL (ref 8.5–10.1)
CHLORIDE SERPL-SCNC: 112 MMOL/L (ref 98–112)
CHOLEST SERPL-MCNC: 203 MG/DL (ref ?–200)
CO2 SERPL-SCNC: 24 MMOL/L (ref 21–32)
COMPLEXED PSA SERPL-MCNC: 3.38 NG/ML (ref ?–4)
CREAT BLD-MCNC: 1.27 MG/DL
DEPRECATED HBV CORE AB SER IA-ACNC: 37.4 NG/ML
EGFRCR SERPLBLD CKD-EPI 2021: 60 ML/MIN/1.73M2 (ref 60–?)
EOSINOPHIL # BLD AUTO: 0.24 X10(3) UL (ref 0–0.7)
EOSINOPHIL NFR BLD AUTO: 2.4 %
ERYTHROCYTE [DISTWIDTH] IN BLOOD BY AUTOMATED COUNT: 12.7 %
FASTING PATIENT LIPID ANSWER: YES
FASTING STATUS PATIENT QL REPORTED: YES
GLOBULIN PLAS-MCNC: 2.9 G/DL (ref 2.8–4.4)
GLUCOSE BLD-MCNC: 100 MG/DL (ref 70–99)
HCT VFR BLD AUTO: 48.7 %
HDLC SERPL-MCNC: 47 MG/DL (ref 40–59)
HGB BLD-MCNC: 15.5 G/DL
IMM GRANULOCYTES # BLD AUTO: 0.14 X10(3) UL (ref 0–1)
IMM GRANULOCYTES NFR BLD: 1.4 %
LDLC SERPL CALC-MCNC: 137 MG/DL (ref ?–100)
LYMPHOCYTES # BLD AUTO: 1.01 X10(3) UL (ref 1–4)
LYMPHOCYTES NFR BLD AUTO: 10 %
MCH RBC QN AUTO: 32.2 PG (ref 26–34)
MCHC RBC AUTO-ENTMCNC: 31.8 G/DL (ref 31–37)
MCV RBC AUTO: 101.2 FL
MONOCYTES # BLD AUTO: 0.88 X10(3) UL (ref 0.1–1)
MONOCYTES NFR BLD AUTO: 8.7 %
NEUTROPHILS # BLD AUTO: 7.81 X10 (3) UL (ref 1.5–7.7)
NEUTROPHILS # BLD AUTO: 7.81 X10(3) UL (ref 1.5–7.7)
NEUTROPHILS NFR BLD AUTO: 76.8 %
NONHDLC SERPL-MCNC: 156 MG/DL (ref ?–130)
OSMOLALITY SERPL CALC.SUM OF ELEC: 293 MOSM/KG (ref 275–295)
PLATELET # BLD AUTO: 313 10(3)UL (ref 150–450)
POTASSIUM SERPL-SCNC: 4.5 MMOL/L (ref 3.5–5.1)
PROT SERPL-MCNC: 6.3 G/DL (ref 6.4–8.2)
RBC # BLD AUTO: 4.81 X10(6)UL
SODIUM SERPL-SCNC: 139 MMOL/L (ref 136–145)
T4 FREE SERPL-MCNC: 1 NG/DL (ref 0.8–1.7)
TRIGL SERPL-MCNC: 103 MG/DL (ref 30–149)
TSI SER-ACNC: 3.27 MIU/ML (ref 0.36–3.74)
VLDLC SERPL CALC-MCNC: 19 MG/DL (ref 0–30)
WBC # BLD AUTO: 10.2 X10(3) UL (ref 4–11)

## 2024-02-17 PROCEDURE — 85025 COMPLETE CBC W/AUTO DIFF WBC: CPT

## 2024-02-17 PROCEDURE — 84439 ASSAY OF FREE THYROXINE: CPT

## 2024-02-17 PROCEDURE — 36415 COLL VENOUS BLD VENIPUNCTURE: CPT

## 2024-02-17 PROCEDURE — 80053 COMPREHEN METABOLIC PANEL: CPT

## 2024-02-17 PROCEDURE — 82728 ASSAY OF FERRITIN: CPT

## 2024-02-17 PROCEDURE — 80061 LIPID PANEL: CPT

## 2024-02-17 PROCEDURE — 84443 ASSAY THYROID STIM HORMONE: CPT

## 2024-02-29 ENCOUNTER — TELEPHONE (OUTPATIENT)
Dept: FAMILY MEDICINE CLINIC | Facility: CLINIC | Age: 74
End: 2024-02-29

## 2024-02-29 DIAGNOSIS — E78.2 MIXED HYPERLIPIDEMIA: Primary | ICD-10-CM

## 2024-02-29 RX ORDER — ATORVASTATIN CALCIUM 10 MG/1
10 TABLET, FILM COATED ORAL NIGHTLY
Qty: 90 TABLET | Refills: 1 | Status: SHIPPED | OUTPATIENT
Start: 2024-02-29 | End: 2024-04-15

## 2024-02-29 NOTE — TELEPHONE ENCOUNTER
Can try increasing to 0.8 mg if wishes to try that for the tamsulosin.    I will send in atorvastatin and we can recheck labs in 3 months    Reese Mendoza MD

## 2024-02-29 NOTE — TELEPHONE ENCOUNTER
Pt wants Dr. Gusman to know the tamsulosin 0.4 MG is working well.  He's \"about 70% cured\"  Should he stay on the same dose?    Also, prescribing a statin was discussed.  Pt asking if this will be done?

## 2024-03-04 DIAGNOSIS — R35.0 BENIGN PROSTATIC HYPERPLASIA WITH URINARY FREQUENCY: ICD-10-CM

## 2024-03-04 DIAGNOSIS — N40.1 BENIGN PROSTATIC HYPERPLASIA WITH URINARY FREQUENCY: ICD-10-CM

## 2024-03-04 RX ORDER — TAMSULOSIN HYDROCHLORIDE 0.4 MG/1
0.4 CAPSULE ORAL EVERY EVENING
Qty: 30 CAPSULE | Refills: 2 | Status: SHIPPED | OUTPATIENT
Start: 2024-03-04 | End: 2024-06-02

## 2024-04-15 ENCOUNTER — TELEPHONE (OUTPATIENT)
Dept: FAMILY MEDICINE CLINIC | Facility: CLINIC | Age: 74
End: 2024-04-15

## 2024-04-15 DIAGNOSIS — E78.2 MIXED HYPERLIPIDEMIA: ICD-10-CM

## 2024-04-15 DIAGNOSIS — R35.0 BENIGN PROSTATIC HYPERPLASIA WITH URINARY FREQUENCY: ICD-10-CM

## 2024-04-15 DIAGNOSIS — I10 ESSENTIAL HYPERTENSION, BENIGN: ICD-10-CM

## 2024-04-15 DIAGNOSIS — D50.0 IRON DEFICIENCY ANEMIA DUE TO CHRONIC BLOOD LOSS: ICD-10-CM

## 2024-04-15 DIAGNOSIS — N40.1 BENIGN PROSTATIC HYPERPLASIA WITH URINARY FREQUENCY: ICD-10-CM

## 2024-04-15 DIAGNOSIS — K26.9 DUODENAL ULCER: ICD-10-CM

## 2024-04-15 RX ORDER — PANTOPRAZOLE SODIUM 40 MG/1
40 TABLET, DELAYED RELEASE ORAL
Qty: 90 TABLET | Refills: 0 | Status: SHIPPED | OUTPATIENT
Start: 2024-04-15

## 2024-04-15 RX ORDER — TAMSULOSIN HYDROCHLORIDE 0.4 MG/1
0.4 CAPSULE ORAL EVERY EVENING
Qty: 30 CAPSULE | Refills: 2 | Status: SHIPPED | OUTPATIENT
Start: 2024-04-15 | End: 2024-07-14

## 2024-04-15 RX ORDER — ATORVASTATIN CALCIUM 10 MG/1
10 TABLET, FILM COATED ORAL NIGHTLY
Qty: 90 TABLET | Refills: 0 | Status: SHIPPED | OUTPATIENT
Start: 2024-04-15

## 2024-04-15 RX ORDER — LISINOPRIL 10 MG/1
10 TABLET ORAL DAILY
Qty: 90 TABLET | Refills: 0 | Status: SHIPPED | OUTPATIENT
Start: 2024-04-15

## 2024-04-15 RX ORDER — LEVOTHYROXINE SODIUM 0.15 MG/1
150 TABLET ORAL
Qty: 90 TABLET | Refills: 0 | Status: SHIPPED | OUTPATIENT
Start: 2024-04-15

## 2024-04-15 NOTE — TELEPHONE ENCOUNTER
[facility] in [place] told pt to call for refills:    tamsulosin 0.4 MG   atorvastatin 10 MG   levothyroxine 150 MCG   lisinopril 10 MG   pantoprazole 40 MG     All 3 mos scripts

## 2024-05-17 DIAGNOSIS — N40.1 BENIGN PROSTATIC HYPERPLASIA WITH URINARY FREQUENCY: ICD-10-CM

## 2024-05-17 DIAGNOSIS — E78.2 MIXED HYPERLIPIDEMIA: ICD-10-CM

## 2024-05-17 DIAGNOSIS — K26.9 DUODENAL ULCER: ICD-10-CM

## 2024-05-17 DIAGNOSIS — R35.0 BENIGN PROSTATIC HYPERPLASIA WITH URINARY FREQUENCY: ICD-10-CM

## 2024-05-17 DIAGNOSIS — I10 ESSENTIAL HYPERTENSION, BENIGN: ICD-10-CM

## 2024-05-17 DIAGNOSIS — D50.0 IRON DEFICIENCY ANEMIA DUE TO CHRONIC BLOOD LOSS: ICD-10-CM

## 2024-05-17 RX ORDER — LISINOPRIL 10 MG/1
10 TABLET ORAL DAILY
Qty: 90 TABLET | Refills: 0 | Status: SHIPPED | OUTPATIENT
Start: 2024-05-17

## 2024-05-17 RX ORDER — ATORVASTATIN CALCIUM 10 MG/1
10 TABLET, FILM COATED ORAL NIGHTLY
Qty: 90 TABLET | Refills: 0 | Status: SHIPPED | OUTPATIENT
Start: 2024-05-17

## 2024-05-17 RX ORDER — PANTOPRAZOLE SODIUM 40 MG/1
40 TABLET, DELAYED RELEASE ORAL
Qty: 90 TABLET | Refills: 0 | Status: SHIPPED | OUTPATIENT
Start: 2024-05-17

## 2024-05-17 RX ORDER — TAMSULOSIN HYDROCHLORIDE 0.4 MG/1
0.4 CAPSULE ORAL EVERY EVENING
Qty: 90 CAPSULE | Refills: 0 | Status: SHIPPED | OUTPATIENT
Start: 2024-05-17 | End: 2024-08-15

## 2024-05-17 RX ORDER — LEVOTHYROXINE SODIUM 0.15 MG/1
150 TABLET ORAL
Qty: 90 TABLET | Refills: 0 | Status: SHIPPED | OUTPATIENT
Start: 2024-05-17

## 2024-05-17 NOTE — TELEPHONE ENCOUNTER
Can this medication be changed to a 90 day supply    tamsulosin 0.4 MG Oral Cap     Patient would also like to refill the following medications early (90 day supply) as he will be going out of town for that length of time.    atorvastatin 10 MG Oral Tab   levothyroxine 150 MCG Oral Tab   lisinopril 10 MG Oral Tab   pantoprazole 40 MG Oral Tab EC

## 2024-05-17 NOTE — TELEPHONE ENCOUNTER
Please review previous message  Pt just had refills 4/15/24 but states he will be out of town when next refills due  Pt also requesting 90 day supply of tamsulosin 0.4 MG Oral Cap  instead of 30.  Scripts pended if agreeable.

## 2024-09-13 DIAGNOSIS — I10 ESSENTIAL HYPERTENSION, BENIGN: ICD-10-CM

## 2024-09-13 DIAGNOSIS — N40.1 BENIGN PROSTATIC HYPERPLASIA WITH URINARY FREQUENCY: ICD-10-CM

## 2024-09-13 DIAGNOSIS — R35.0 BENIGN PROSTATIC HYPERPLASIA WITH URINARY FREQUENCY: ICD-10-CM

## 2024-09-13 DIAGNOSIS — K26.9 DUODENAL ULCER: ICD-10-CM

## 2024-09-13 DIAGNOSIS — D50.0 IRON DEFICIENCY ANEMIA DUE TO CHRONIC BLOOD LOSS: ICD-10-CM

## 2024-09-13 DIAGNOSIS — E78.2 MIXED HYPERLIPIDEMIA: ICD-10-CM

## 2024-09-13 NOTE — TELEPHONE ENCOUNTER
Patient calling requesting refills on the following:    tamsulosin 0.4 MG   atorvastatin 10 MG   levothyroxine 150 MCG   lisinopril 10 MG   pantoprazole 40 MG      All 3 mos scripts    Send to   [id number] - [place] - [address] [phone], [phone]

## 2024-09-16 RX ORDER — ATORVASTATIN CALCIUM 10 MG/1
10 TABLET, FILM COATED ORAL NIGHTLY
Qty: 90 TABLET | Refills: 0 | Status: SHIPPED | OUTPATIENT
Start: 2024-09-16

## 2024-09-16 RX ORDER — PANTOPRAZOLE SODIUM 40 MG/1
40 TABLET, DELAYED RELEASE ORAL
Qty: 90 TABLET | Refills: 0 | Status: SHIPPED | OUTPATIENT
Start: 2024-09-16

## 2024-09-16 RX ORDER — TAMSULOSIN HYDROCHLORIDE 0.4 MG/1
0.4 CAPSULE ORAL EVERY EVENING
Qty: 90 CAPSULE | Refills: 0 | Status: SHIPPED | OUTPATIENT
Start: 2024-09-16 | End: 2024-12-15

## 2024-09-16 RX ORDER — LEVOTHYROXINE SODIUM 150 UG/1
150 TABLET ORAL
Qty: 90 TABLET | Refills: 0 | Status: SHIPPED | OUTPATIENT
Start: 2024-09-16

## 2024-09-16 RX ORDER — LISINOPRIL 10 MG/1
10 TABLET ORAL DAILY
Qty: 90 TABLET | Refills: 0 | Status: SHIPPED | OUTPATIENT
Start: 2024-09-16

## 2025-01-23 DIAGNOSIS — D50.0 IRON DEFICIENCY ANEMIA DUE TO CHRONIC BLOOD LOSS: ICD-10-CM

## 2025-01-23 DIAGNOSIS — K26.9 DUODENAL ULCER: ICD-10-CM

## 2025-01-23 DIAGNOSIS — I10 ESSENTIAL HYPERTENSION, BENIGN: ICD-10-CM

## 2025-01-23 DIAGNOSIS — N40.1 BENIGN PROSTATIC HYPERPLASIA WITH URINARY FREQUENCY: ICD-10-CM

## 2025-01-23 DIAGNOSIS — R35.0 BENIGN PROSTATIC HYPERPLASIA WITH URINARY FREQUENCY: ICD-10-CM

## 2025-01-23 RX ORDER — TADALAFIL 5 MG/1
5 TABLET ORAL DAILY
COMMUNITY
Start: 2024-11-11

## 2025-01-23 RX ORDER — TAMSULOSIN HYDROCHLORIDE 0.4 MG/1
0.4 CAPSULE ORAL EVERY EVENING
Qty: 90 CAPSULE | Refills: 0 | OUTPATIENT
Start: 2025-01-23

## 2025-01-23 RX ORDER — LISINOPRIL 10 MG/1
10 TABLET ORAL DAILY
Qty: 90 TABLET | Refills: 0 | Status: SHIPPED | OUTPATIENT
Start: 2025-01-23

## 2025-01-23 RX ORDER — LEVOTHYROXINE SODIUM 150 UG/1
150 TABLET ORAL
Qty: 90 TABLET | Refills: 0 | Status: SHIPPED | OUTPATIENT
Start: 2025-01-23

## 2025-01-23 RX ORDER — TAMSULOSIN HYDROCHLORIDE 0.4 MG/1
0.4 CAPSULE ORAL EVERY EVENING
Qty: 90 CAPSULE | Refills: 0 | Status: SHIPPED | OUTPATIENT
Start: 2025-01-23

## 2025-01-23 RX ORDER — TAMSULOSIN HYDROCHLORIDE 0.4 MG/1
0.4 CAPSULE ORAL EVERY EVENING
COMMUNITY
Start: 2024-11-14 | End: 2025-01-23

## 2025-01-23 RX ORDER — PANTOPRAZOLE SODIUM 40 MG/1
40 TABLET, DELAYED RELEASE ORAL
Qty: 90 TABLET | Refills: 0 | Status: SHIPPED | OUTPATIENT
Start: 2025-01-23

## 2025-03-10 DIAGNOSIS — E78.2 MIXED HYPERLIPIDEMIA: ICD-10-CM

## 2025-03-10 RX ORDER — ATORVASTATIN CALCIUM 10 MG/1
10 TABLET, FILM COATED ORAL NIGHTLY
Qty: 90 TABLET | Refills: 0 | Status: SHIPPED | OUTPATIENT
Start: 2025-03-10

## 2025-03-15 ENCOUNTER — LAB ENCOUNTER (OUTPATIENT)
Dept: LAB | Age: 75
End: 2025-03-15
Attending: FAMILY MEDICINE
Payer: MEDICARE

## 2025-03-15 ENCOUNTER — OFFICE VISIT (OUTPATIENT)
Dept: FAMILY MEDICINE CLINIC | Facility: CLINIC | Age: 75
End: 2025-03-15
Payer: MEDICARE

## 2025-03-15 VITALS
OXYGEN SATURATION: 97 % | HEART RATE: 68 BPM | RESPIRATION RATE: 16 BRPM | HEIGHT: 62 IN | DIASTOLIC BLOOD PRESSURE: 64 MMHG | WEIGHT: 147 LBS | BODY MASS INDEX: 27.05 KG/M2 | SYSTOLIC BLOOD PRESSURE: 110 MMHG

## 2025-03-15 DIAGNOSIS — Z13.0 SCREENING FOR DEFICIENCY ANEMIA: ICD-10-CM

## 2025-03-15 DIAGNOSIS — N52.9 ERECTILE DYSFUNCTION, UNSPECIFIED ERECTILE DYSFUNCTION TYPE: ICD-10-CM

## 2025-03-15 DIAGNOSIS — Z13.6 SCREENING FOR CARDIOVASCULAR CONDITION: ICD-10-CM

## 2025-03-15 DIAGNOSIS — Z12.5 PROSTATE CANCER SCREENING: ICD-10-CM

## 2025-03-15 DIAGNOSIS — E03.9 HYPOTHYROIDISM, UNSPECIFIED TYPE: ICD-10-CM

## 2025-03-15 DIAGNOSIS — Z00.00 ENCOUNTER FOR ANNUAL HEALTH EXAMINATION: ICD-10-CM

## 2025-03-15 DIAGNOSIS — Z00.00 ENCOUNTER FOR ANNUAL HEALTH EXAMINATION: Primary | ICD-10-CM

## 2025-03-15 DIAGNOSIS — E61.1 LOW IRON: ICD-10-CM

## 2025-03-15 DIAGNOSIS — I10 ESSENTIAL HYPERTENSION, BENIGN: ICD-10-CM

## 2025-03-15 LAB
ALBUMIN SERPL-MCNC: 4.1 G/DL (ref 3.2–4.8)
ALBUMIN/GLOB SERPL: 2.4 {RATIO} (ref 1–2)
ALP LIVER SERPL-CCNC: 67 U/L
ALT SERPL-CCNC: 41 U/L
ANION GAP SERPL CALC-SCNC: 7 MMOL/L (ref 0–18)
AST SERPL-CCNC: 33 U/L (ref ?–34)
BASOPHILS # BLD AUTO: 0.04 X10(3) UL (ref 0–0.2)
BASOPHILS NFR BLD AUTO: 0.5 %
BILIRUB SERPL-MCNC: 0.4 MG/DL (ref 0.2–1.1)
BUN BLD-MCNC: 30 MG/DL (ref 9–23)
CALCIUM BLD-MCNC: 8.8 MG/DL (ref 8.7–10.6)
CHLORIDE SERPL-SCNC: 108 MMOL/L (ref 98–112)
CHOLEST SERPL-MCNC: 139 MG/DL (ref ?–200)
CO2 SERPL-SCNC: 28 MMOL/L (ref 21–32)
COMPLEXED PSA SERPL-MCNC: 2.63 NG/ML (ref ?–4)
CREAT BLD-MCNC: 1.3 MG/DL
DEPRECATED HBV CORE AB SER IA-ACNC: 25 NG/ML
EGFRCR SERPLBLD CKD-EPI 2021: 58 ML/MIN/1.73M2 (ref 60–?)
EOSINOPHIL # BLD AUTO: 0.13 X10(3) UL (ref 0–0.7)
EOSINOPHIL NFR BLD AUTO: 1.5 %
ERYTHROCYTE [DISTWIDTH] IN BLOOD BY AUTOMATED COUNT: 12.1 %
FASTING PATIENT LIPID ANSWER: YES
FASTING STATUS PATIENT QL REPORTED: YES
GLOBULIN PLAS-MCNC: 1.7 G/DL (ref 2–3.5)
GLUCOSE BLD-MCNC: 99 MG/DL (ref 70–99)
HCT VFR BLD AUTO: 48.4 %
HDLC SERPL-MCNC: 41 MG/DL (ref 40–59)
HGB BLD-MCNC: 15.6 G/DL
IMM GRANULOCYTES # BLD AUTO: 0.07 X10(3) UL (ref 0–1)
IMM GRANULOCYTES NFR BLD: 0.8 %
LDLC SERPL CALC-MCNC: 82 MG/DL (ref ?–100)
LYMPHOCYTES # BLD AUTO: 1.03 X10(3) UL (ref 1–4)
LYMPHOCYTES NFR BLD AUTO: 11.7 %
MCH RBC QN AUTO: 32.8 PG (ref 26–34)
MCHC RBC AUTO-ENTMCNC: 32.2 G/DL (ref 31–37)
MCV RBC AUTO: 101.7 FL
MONOCYTES # BLD AUTO: 0.82 X10(3) UL (ref 0.1–1)
MONOCYTES NFR BLD AUTO: 9.3 %
NEUTROPHILS # BLD AUTO: 6.75 X10 (3) UL (ref 1.5–7.7)
NEUTROPHILS # BLD AUTO: 6.75 X10(3) UL (ref 1.5–7.7)
NEUTROPHILS NFR BLD AUTO: 76.2 %
NONHDLC SERPL-MCNC: 98 MG/DL (ref ?–130)
OSMOLALITY SERPL CALC.SUM OF ELEC: 302 MOSM/KG (ref 275–295)
PLATELET # BLD AUTO: 274 10(3)UL (ref 150–450)
POTASSIUM SERPL-SCNC: 4.7 MMOL/L (ref 3.5–5.1)
PROT SERPL-MCNC: 5.8 G/DL (ref 5.7–8.2)
RBC # BLD AUTO: 4.76 X10(6)UL
SODIUM SERPL-SCNC: 143 MMOL/L (ref 136–145)
T4 FREE SERPL-MCNC: 1.9 NG/DL (ref 0.8–1.7)
TESTOST SERPL-MCNC: 433.02 NG/DL
TRIGL SERPL-MCNC: 82 MG/DL (ref 30–149)
TSI SER-ACNC: 0.82 UIU/ML (ref 0.55–4.78)
VLDLC SERPL CALC-MCNC: 13 MG/DL (ref 0–30)
WBC # BLD AUTO: 8.8 X10(3) UL (ref 4–11)

## 2025-03-15 PROCEDURE — 84439 ASSAY OF FREE THYROXINE: CPT

## 2025-03-15 PROCEDURE — 85025 COMPLETE CBC W/AUTO DIFF WBC: CPT

## 2025-03-15 PROCEDURE — 80053 COMPREHEN METABOLIC PANEL: CPT

## 2025-03-15 PROCEDURE — 80061 LIPID PANEL: CPT

## 2025-03-15 PROCEDURE — 84403 ASSAY OF TOTAL TESTOSTERONE: CPT

## 2025-03-15 PROCEDURE — 84443 ASSAY THYROID STIM HORMONE: CPT

## 2025-03-15 PROCEDURE — 36415 COLL VENOUS BLD VENIPUNCTURE: CPT

## 2025-03-15 PROCEDURE — 82728 ASSAY OF FERRITIN: CPT

## 2025-03-15 RX ORDER — TADALAFIL 20 MG/1
20 TABLET ORAL AS NEEDED
Qty: 24 TABLET | Refills: 3 | Status: SHIPPED | OUTPATIENT
Start: 2025-03-15

## 2025-03-15 NOTE — PROGRESS NOTES
Subjective:   Victor Manuel Sharma is a 74 year old male who presents for a Medicare Subsequent Annual Wellness visit (Pt already had Initial Annual Wellness) and scheduled follow up of multiple significant but stable problems.     Here for wellness visit.    Planning to move to [facility] in the coming years.        History/Other:   Fall Risk Assessment:   He has been screened for Falls and is low risk.      Cognitive Assessment:   He had a completely normal cognitive assessment - see flowsheet entries     Functional Ability/Status:   Victor Manuel Sharma has a completely normal functional assessment. See flowsheet for details.    Depression Screening (PHQ):  PHQ-2 SCORE: 0  , done 3/9/2025        Advanced Directives:   He does NOT have a Living Will. [Do you have a living will?: (Patient-Rptd) No]  He does NOT have a Power of  for Health Care. [Do you have a healthcare power of ?: (Patient-Rptd) No]  Discussed Advance Care Planning with patient (and family/surrogate if present). Standard forms made available to patient in After Visit Summary.      Patient Active Problem List   Diagnosis    Essential hypertension, benign    Erectile dysfunction    Presbycusis    Hypothyroidism    Richmond's esophagus without dysplasia    History of adenomatous polyp of colon     Allergies:  He has No Known Allergies.    Current Medications:  Outpatient Medications Marked as Taking for the 3/15/25 encounter (Office Visit) with Reese Mendoza MD   Medication Sig    Tadalafil (CIALIS) 20 MG Oral Tab Take 1 tablet (20 mg total) by mouth as needed for Erectile Dysfunction.    ATORVASTATIN 10 MG Oral Tab Take 1 tablet by mouth nightly    LEVOTHYROXINE 150 MCG Oral Tab TAKE 1 TABLET BY MOUTH BEFORE BREAKFAST    PANTOPRAZOLE 40 MG Oral Tab EC TAKE 1 TABLET BY MOUTH BEFORE BREAKFAST    LISINOPRIL 10 MG Oral Tab Take 1 tablet by mouth once daily    tadalafil 5 MG Oral Tab Take 1 tablet (5 mg total) by mouth daily.    tamsulosin  0.4 MG Oral Cap Take 1 capsule (0.4 mg total) by mouth every evening.    albuterol 108 (90 Base) MCG/ACT Inhalation Aero Soln Inhale 2 puffs into the lungs every 6 (six) hours as needed.    multivitamin Oral Tab Take 1 tablet by mouth daily.    Cholecalciferol (VITAMIN D) 1000 units Oral Tab Take 1 capsule by mouth daily.    Omega-3 Fatty Acids (FISH OIL) 1200 MG Oral Capsule Delayed Release Take by mouth daily.       Medical History:  He  has a past medical history of Abdominal hernia, Anal polyp, Anemia, Benign neoplasm of ascending colon, Benign neoplasm of colon, Benign neoplasm of sigmoid colon, Benign neoplasm of transverse colon, Disorder of thyroid, Heart palpitations, Hemorrhoids, High blood pressure, Hypothyroidism, Inguinal hernia bilateral, non-recurrent, Iron deficiency anemia due to chronic blood loss, Leaking of urine, Tubular adenoma of colon, Unspecified cataract, Visual impairment, and Wheezing.  Surgical History:  He  has a past surgical history that includes colonoscopy (2004); appendectomy; tonsillectomy; hernia surgery (05/2016); hemorrhoidectomy; other surgical history; hemorrhoidectomy,int/ext,simple; and egd.   Family History:  His family history includes Breast Cancer (age of onset: 76) in his mother; Cancer in his paternal uncle; Hypertension in his father; Stroke in his father.  Social History:  He  reports that he has never smoked. He has never used smokeless tobacco. He reports current alcohol use of about 3.0 standard drinks of alcohol per week. He reports that he does not use drugs.    Tobacco:  He has never smoked tobacco.    CAGE Alcohol Screen:   CAGE screening score of 0 on 3/9/2025, showing low risk of alcohol abuse.      Patient Care Team:  Reese Mendoza MD as PCP - General (Family Medicine)  Rome Lang DO (GASTROENTEROLOGY)  Ajay Sandoval APRN (Nurse Practitioner)    Review of Systems  Negative except as noted in HPI    Objective:   Physical Exam  General  Appearance:  Alert, cooperative, no distress, appears stated age   Head:  Normocephalic, without obvious abnormality, atraumatic   Eyes:  PERRL, conjunctiva/corneas clear, EOM's intact, both eyes   Ears:  Normal TM's and external ear canals, both ears   Nose: Nares normal, septum midline, mucosa normal, no drainage or sinus tenderness   Throat: Lips, mucosa, and tongue normal; teeth and gums normal   Neck: Supple, symmetrical, trachea midline, no adenopathy, thyroid: not enlarged, symmetric, no tenderness/mass/nodules, no carotid bruit or JVD   Back:   Symmetric, no curvature, ROM normal, no CVA tenderness   Lungs:   Clear to auscultation bilaterally, respirations unlabored   Chest Wall:  No tenderness or deformity   Heart:  Regular rate and rhythm, S1, S2 normal, no murmur, rub or gallop   Abdomen:   Soft, non-tender, bowel sounds active all four quadrants,  no masses, no organomegaly   Genitalia: Normal male   Rectal: Normal tone, normal prostate, no masses or tenderness   Extremities: Extremities normal, atraumatic, no cyanosis or edema   Pulses: 2+ and symmetric   Skin: Skin color, texture, turgor normal, no rashes or lesions   Lymph nodes: Cervical, supraclavicular, and axillary nodes normal   Neurologic: Normal     /64   Pulse 68   Resp 16   Ht 5' 2\" (1.575 m)   Wt 147 lb (66.7 kg)   SpO2 97%   BMI 26.89 kg/m²  Estimated body mass index is 26.89 kg/m² as calculated from the following:    Height as of this encounter: 5' 2\" (1.575 m).    Weight as of this encounter: 147 lb (66.7 kg).    Medicare Hearing Assessment:   Hearing Screening    Screening Method: Finger Rub  Finger Rub Result: Pass         Visual Acuity:   Right Eye Visual Acuity: Corrected Right Eye Chart Acuity: 20/20   Left Eye Visual Acuity: Corrected Left Eye Chart Acuity: 20/20   Both Eyes Visual Acuity: Corrected Both Eyes Chart Acuity: 20/20   Able To Tolerate Visual Acuity: Yes        Assessment & Plan:   Victor Manuel Sharma is a 74  year old male who presents for a Medicare Assessment.     1. Encounter for annual health examination (Primary)  -     PSA Total, Screen; Future; Expected date: 03/15/2025  -     Lipid Panel; Future; Expected date: 03/15/2025  -     Comp Metabolic Panel (14); Future; Expected date: 03/15/2025  -     CBC With Differential With Platelet; Future; Expected date: 03/15/2025  2. Essential hypertension, benign - Controlled, will get screening labs  -     Comp Metabolic Panel (14); Future; Expected date: 03/15/2025  3. Hypothyroidism, unspecified type - Controlled, will check labs  -     TSH and Free T4; Future; Expected date: 03/15/2025  4. Screening for deficiency anemia  -     CBC With Differential With Platelet; Future; Expected date: 03/15/2025  5. Prostate cancer screening  -     PSA Total, Screen; Future; Expected date: 03/15/2025  6. Screening for cardiovascular condition  -     Lipid Panel; Future; Expected date: 03/15/2025  7. Erectile dysfunction, unspecified erectile dysfunction type  -     Testosterone Total; Future; Expected date: 03/15/2025  8. Low iron  -     Ferritin; Future; Expected date: 03/15/2025  Other orders  -     Tadalafil; Take 1 tablet (20 mg total) by mouth as needed for Erectile Dysfunction.  Dispense: 24 tablet; Refill: 3    The patient indicates understanding of these issues and agrees to the plan.  Lab work ordered.  Reinforced healthy diet, lifestyle, and exercise.      No follow-ups on file.     Reese Mendoza MD, 3/15/2025     Supplementary Documentation:   General Health:  In the past six months, have you lost more than 10 pounds without trying?: (Patient-Rptd) 2 - No  Has your appetite been poor?: (Patient-Rptd) No  Type of Diet: (Patient-Rptd) Balanced  How does the patient maintain a good energy level?: (Patient-Rptd) Appropriate Exercise, Daily Walks, Stretching  How would you describe your daily physical activity?: (Patient-Rptd) Moderate  How would you describe your current health  state?: (Patient-Rptd) Good  How do you maintain positive mental well-being?: (Patient-Rptd) Social Interaction  On a scale of 0 to 10, with 0 being no pain and 10 being severe pain, what is your pain level?: (Patient-Rptd) 0 - (None)  In the past six months, have you experienced urine leakage?: (Patient-Rptd) 1-Yes  At any time do you feel concerned for the safety/well-being of yourself and/or your children, in your home or elsewhere?: (Patient-Rptd) No  Have you had any immunizations at another office such as Influenza, Hepatitis B, Tetanus, or Pneumococcal?: (Patient-Rptd) No    Health Maintenance   Topic Date Due    COVID-19 Vaccine (4 - 2024-25 season) 09/01/2024    Annual Depression Screening  01/01/2025    Annual Physical  02/05/2025    PSA  02/17/2026    Colorectal Cancer Screening  08/07/2030    Influenza Vaccine  Completed    Fall Risk Screening (Annual)  Completed    Pneumococcal Vaccine: 50+ Years  Completed    Zoster Vaccines  Completed    Meningococcal B Vaccine  Aged Out

## 2025-04-21 DIAGNOSIS — I10 ESSENTIAL HYPERTENSION, BENIGN: ICD-10-CM

## 2025-04-21 DIAGNOSIS — K26.9 DUODENAL ULCER: ICD-10-CM

## 2025-04-21 DIAGNOSIS — D50.0 IRON DEFICIENCY ANEMIA DUE TO CHRONIC BLOOD LOSS: ICD-10-CM

## 2025-04-21 DIAGNOSIS — E78.2 MIXED HYPERLIPIDEMIA: ICD-10-CM

## 2025-04-21 RX ORDER — TAMSULOSIN HYDROCHLORIDE 0.4 MG/1
0.4 CAPSULE ORAL EVERY EVENING
Qty: 90 CAPSULE | Refills: 0 | Status: SHIPPED | OUTPATIENT
Start: 2025-04-21

## 2025-04-21 RX ORDER — PANTOPRAZOLE SODIUM 40 MG/1
40 TABLET, DELAYED RELEASE ORAL
Qty: 90 TABLET | Refills: 0 | Status: SHIPPED | OUTPATIENT
Start: 2025-04-21

## 2025-04-21 RX ORDER — LISINOPRIL 10 MG/1
10 TABLET ORAL DAILY
Qty: 90 TABLET | Refills: 0 | Status: SHIPPED | OUTPATIENT
Start: 2025-04-21

## 2025-04-21 RX ORDER — ATORVASTATIN CALCIUM 10 MG/1
10 TABLET, FILM COATED ORAL NIGHTLY
Qty: 90 TABLET | Refills: 0 | Status: SHIPPED | OUTPATIENT
Start: 2025-04-21

## 2025-04-21 RX ORDER — LEVOTHYROXINE SODIUM 150 UG/1
150 TABLET ORAL
Qty: 90 TABLET | Refills: 0 | Status: SHIPPED | OUTPATIENT
Start: 2025-04-21

## 2025-05-19 DIAGNOSIS — D50.0 IRON DEFICIENCY ANEMIA DUE TO CHRONIC BLOOD LOSS: ICD-10-CM

## 2025-05-19 DIAGNOSIS — E78.2 MIXED HYPERLIPIDEMIA: ICD-10-CM

## 2025-05-19 DIAGNOSIS — K26.9 DUODENAL ULCER: ICD-10-CM

## 2025-05-19 DIAGNOSIS — I10 ESSENTIAL HYPERTENSION, BENIGN: ICD-10-CM

## 2025-05-19 RX ORDER — PANTOPRAZOLE SODIUM 40 MG/1
40 TABLET, DELAYED RELEASE ORAL
Qty: 90 TABLET | Refills: 0 | Status: SHIPPED | OUTPATIENT
Start: 2025-05-19

## 2025-05-19 RX ORDER — LISINOPRIL 10 MG/1
10 TABLET ORAL DAILY
Qty: 90 TABLET | Refills: 0 | Status: SHIPPED | OUTPATIENT
Start: 2025-05-19

## 2025-05-19 RX ORDER — TAMSULOSIN HYDROCHLORIDE 0.4 MG/1
0.4 CAPSULE ORAL EVERY EVENING
Qty: 90 CAPSULE | Refills: 0 | Status: SHIPPED | OUTPATIENT
Start: 2025-05-19

## 2025-05-19 RX ORDER — LEVOTHYROXINE SODIUM 150 UG/1
150 TABLET ORAL
Qty: 90 TABLET | Refills: 0 | Status: SHIPPED | OUTPATIENT
Start: 2025-05-19

## 2025-05-19 RX ORDER — ATORVASTATIN CALCIUM 10 MG/1
10 TABLET, FILM COATED ORAL NIGHTLY
Qty: 90 TABLET | Refills: 0 | Status: SHIPPED | OUTPATIENT
Start: 2025-05-19

## 2025-05-19 RX ORDER — TADALAFIL 20 MG/1
20 TABLET ORAL AS NEEDED
Qty: 24 TABLET | Refills: 3 | Status: SHIPPED | OUTPATIENT
Start: 2025-05-19

## 2025-05-19 NOTE — TELEPHONE ENCOUNTER
LISINOPRIL 10 MG Oral Tab     LEVOTHYROXINE 150 MCG Oral Tab      PANTOPRAZOLE 40 MG Oral Tab EC      TAMSULOSIN 0.4 MG Oral Cap     ATORVASTATIN 10 MG Oral Tab     Tadalafil (CIALIS) 20 MG Oral Tab       Leaving for [place] for the summer and needs them refilled

## 2025-05-19 NOTE — TELEPHONE ENCOUNTER
Patient requesting early med refills of  LISINOPRIL 10 MG Oral Tab     LEVOTHYROXINE 150 MCG Oral Tab      PANTOPRAZOLE 40 MG Oral Tab EC       TAMSULOSIN 0.4 MG Oral Cap      ATORVASTATIN 10 MG Oral Tab      Tadalafil (CIALIS) 20 MG Oral Tab      Leaving for [facility] and not returning until late August  Last office visit 3/15/25

## 2025-07-21 DIAGNOSIS — D50.0 IRON DEFICIENCY ANEMIA DUE TO CHRONIC BLOOD LOSS: ICD-10-CM

## 2025-07-21 DIAGNOSIS — I10 ESSENTIAL HYPERTENSION, BENIGN: ICD-10-CM

## 2025-07-21 DIAGNOSIS — K26.9 DUODENAL ULCER: ICD-10-CM

## 2025-07-21 RX ORDER — LEVOTHYROXINE SODIUM 150 UG/1
150 TABLET ORAL
Qty: 90 TABLET | Refills: 0 | Status: SHIPPED | OUTPATIENT
Start: 2025-07-21

## 2025-07-21 RX ORDER — PANTOPRAZOLE SODIUM 40 MG/1
40 TABLET, DELAYED RELEASE ORAL
Qty: 90 TABLET | Refills: 0 | Status: SHIPPED | OUTPATIENT
Start: 2025-07-21

## 2025-07-21 RX ORDER — LISINOPRIL 10 MG/1
10 TABLET ORAL DAILY
Qty: 90 TABLET | Refills: 0 | Status: SHIPPED | OUTPATIENT
Start: 2025-07-21

## 2025-08-11 RX ORDER — TAMSULOSIN HYDROCHLORIDE 0.4 MG/1
0.4 CAPSULE ORAL EVERY EVENING
Qty: 90 CAPSULE | Refills: 0 | Status: SHIPPED | OUTPATIENT
Start: 2025-08-11

## 2025-08-17 DIAGNOSIS — I10 ESSENTIAL HYPERTENSION, BENIGN: ICD-10-CM

## 2025-08-17 DIAGNOSIS — D50.0 IRON DEFICIENCY ANEMIA DUE TO CHRONIC BLOOD LOSS: ICD-10-CM

## 2025-08-17 DIAGNOSIS — K26.9 DUODENAL ULCER: ICD-10-CM

## 2025-08-18 RX ORDER — LISINOPRIL 10 MG/1
10 TABLET ORAL DAILY
Qty: 90 TABLET | Refills: 0 | Status: SHIPPED | OUTPATIENT
Start: 2025-08-18

## 2025-08-18 RX ORDER — PANTOPRAZOLE SODIUM 40 MG/1
40 TABLET, DELAYED RELEASE ORAL
Qty: 90 TABLET | Refills: 0 | Status: SHIPPED | OUTPATIENT
Start: 2025-08-18

## (undated) NOTE — LETTER
04/11/19        [facility] [id number]      Dear Isaac Thurman,    [id number] records indicate that you have outstanding lab work and or testing that was ordered for you and has not yet been completed:  Orders Placed This Encounter